# Patient Record
Sex: FEMALE | Race: WHITE | NOT HISPANIC OR LATINO | Employment: FULL TIME | URBAN - METROPOLITAN AREA
[De-identification: names, ages, dates, MRNs, and addresses within clinical notes are randomized per-mention and may not be internally consistent; named-entity substitution may affect disease eponyms.]

---

## 2018-03-22 ENCOUNTER — LAB REQUISITION (OUTPATIENT)
Dept: LAB | Facility: HOSPITAL | Age: 48
End: 2018-03-22

## 2018-03-22 ENCOUNTER — OFFICE VISIT (OUTPATIENT)
Dept: GASTROENTEROLOGY | Facility: CLINIC | Age: 48
End: 2018-03-22

## 2018-03-22 VITALS
RESPIRATION RATE: 14 BRPM | OXYGEN SATURATION: 98 % | HEIGHT: 72 IN | SYSTOLIC BLOOD PRESSURE: 110 MMHG | BODY MASS INDEX: 26.82 KG/M2 | WEIGHT: 198 LBS | TEMPERATURE: 98.7 F | DIASTOLIC BLOOD PRESSURE: 80 MMHG | HEART RATE: 90 BPM

## 2018-03-22 DIAGNOSIS — K50.80 CROHN'S DISEASE OF BOTH SMALL AND LARGE INTESTINE WITHOUT COMPLICATION (HCC): ICD-10-CM

## 2018-03-22 DIAGNOSIS — Z00.00 ROUTINE GENERAL MEDICAL EXAMINATION AT A HEALTH CARE FACILITY: ICD-10-CM

## 2018-03-22 DIAGNOSIS — R53.83 OTHER FATIGUE: ICD-10-CM

## 2018-03-22 DIAGNOSIS — K50.80 CROHN'S DISEASE OF BOTH SMALL AND LARGE INTESTINE WITHOUT COMPLICATION (HCC): Primary | ICD-10-CM

## 2018-03-22 DIAGNOSIS — R53.83 MALAISE AND FATIGUE: ICD-10-CM

## 2018-03-22 DIAGNOSIS — R53.81 MALAISE AND FATIGUE: ICD-10-CM

## 2018-03-22 PROCEDURE — 99213 OFFICE O/P EST LOW 20 MIN: CPT | Performed by: INTERNAL MEDICINE

## 2018-03-22 PROCEDURE — 36415 COLL VENOUS BLD VENIPUNCTURE: CPT | Performed by: INTERNAL MEDICINE

## 2018-03-22 NOTE — PROGRESS NOTES
"[facility] Gastroenterology   History & Physical    Chief Complaint   Patient presents with   • Crohn's Disease         Subjective CC: Crohn's disease      HPI  Doing well with her Crohn's and is on no medications.  Has to take Metamucil to have normal consistency of stool and control without incontinence.  No bleeding.  Last colonoscopy in 2016.  She has lost 50 lbs voluntarily.        History reviewed. No pertinent past medical history.      History reviewed. No pertinent family history.       reports that she has never smoked. She has never used smokeless tobacco. She reports that she does not drink alcohol or use drugs.      No current outpatient prescriptions on file.    Allergies:  Review of patient's allergies indicates no known allergies.    ROS:    Review of Systems   All other systems reviewed and are negative.      Objective     Blood pressure 110/80, pulse 90, temperature 98.7 °F (37.1 °C), temperature source Temporal Artery , resp. rate 14, height 182.9 cm (72\"), weight 89.8 kg (198 lb), SpO2 98 %.    Physical Exam   Constitutional: Pt is oriented to person, place, and time and well-developed, well-nourished, and in no distress.   HENT:   Mouth/Throat: Oropharynx is clear and moist.   Neck: Normal range of motion. Neck supple.   Cardiovascular: Normal rate, regular rhythm and normal heart sounds.    Pulmonary/Chest: Effort normal and breath sounds normal. No respiratory distress. No  wheezes.   Abdominal: Soft. Bowel sounds are normal.  Soft, non-tender, normal bowel sounds; no bruits, organomegaly or masses.  Skin: Skin is warm and dry.   Psychiatric: Mood, memory, affect and judgment normal.     Assessment/Plan Overall her Crohn's is stable.  She is due for another colonoscopy for surveillence and biopsies looking for dysplasia.  Since she is feeling a bit more fatigued, will check for anemia, B-12 deficiency.     Diagnosis:  Birgit was seen today for crohn's disease.    Diagnoses and " all orders for this visit:    Crohn's disease of both small and large intestine without complication  -     CBC & Differential  -     Folate  -     Vitamin B12  -     Iron Profile  -     C-reactive Protein  -     External [facility] Surgical/Procedural Request    Malaise and fatigue  -     CBC & Differential  -     Folate  -     Vitamin B12  -     Iron Profile  -     C-reactive Protein        Anticipated Surgical Procedure:    The risks, benefits, and alternatives of this procedure have been discussed with the patient or the responsible party- the patient understands and agrees to proceed.

## 2018-03-23 LAB
BASOPHILS # BLD AUTO: 0.05 10*3/MM3 (ref 0–0.2)
BASOPHILS NFR BLD AUTO: 0.7 % (ref 0–1)
CRP SERPL-MCNC: 0.06 MG/DL (ref 0–1)
EOSINOPHIL # BLD AUTO: 0.13 10*3/MM3 (ref 0–0.3)
EOSINOPHIL NFR BLD AUTO: 1.7 % (ref 0–3)
ERYTHROCYTE [DISTWIDTH] IN BLOOD BY AUTOMATED COUNT: 12.9 % (ref 11.3–14.5)
FOLATE SERPL-MCNC: 19.77 NG/ML (ref 3.2–20)
HCT VFR BLD AUTO: 42.5 % (ref 34.5–44)
HGB BLD-MCNC: 14.3 G/DL (ref 11.5–15.5)
IMM GRANULOCYTES # BLD: 0.06 10*3/MM3 (ref 0–0.03)
IMM GRANULOCYTES NFR BLD: 0.8 % (ref 0–0.6)
IRON SATN MFR SERPL: 25 % (ref 15–50)
IRON SERPL-MCNC: 92 MCG/DL (ref 50–175)
LYMPHOCYTES # BLD AUTO: 2.09 10*3/MM3 (ref 0.6–4.8)
LYMPHOCYTES NFR BLD AUTO: 27.7 % (ref 24–44)
MCH RBC QN AUTO: 31 PG (ref 27–31)
MCHC RBC AUTO-ENTMCNC: 33.6 G/DL (ref 32–36)
MCV RBC AUTO: 92.2 FL (ref 80–99)
MONOCYTES # BLD AUTO: 0.69 10*3/MM3 (ref 0–1)
MONOCYTES NFR BLD AUTO: 9.2 % (ref 0–12)
NEUTROPHILS # BLD AUTO: 4.52 10*3/MM3 (ref 1.5–8.3)
NEUTROPHILS NFR BLD AUTO: 59.9 % (ref 41–71)
PLATELET # BLD AUTO: 236 10*3/MM3 (ref 150–450)
RBC # BLD AUTO: 4.61 10*6/MM3 (ref 3.89–5.14)
TIBC SERPL-MCNC: 371 MCG/DL (ref 250–450)
UIBC SERPL-MCNC: 279 MCG/DL
VIT B12 SERPL-MCNC: 227 PG/ML (ref 211–911)
WBC # BLD AUTO: 7.54 10*3/MM3 (ref 3.5–10.8)

## 2019-03-05 ENCOUNTER — OFFICE VISIT (OUTPATIENT)
Dept: GASTROENTEROLOGY | Facility: CLINIC | Age: 49
End: 2019-03-05

## 2019-03-05 ENCOUNTER — LAB (OUTPATIENT)
Dept: LAB | Facility: HOSPITAL | Age: 49
End: 2019-03-05

## 2019-03-05 VITALS
SYSTOLIC BLOOD PRESSURE: 120 MMHG | OXYGEN SATURATION: 98 % | HEART RATE: 94 BPM | TEMPERATURE: 97.4 F | DIASTOLIC BLOOD PRESSURE: 80 MMHG | BODY MASS INDEX: 27.63 KG/M2 | HEIGHT: 72 IN | WEIGHT: 204 LBS

## 2019-03-05 DIAGNOSIS — Z87.19 HISTORY OF CROHN'S DISEASE: Primary | ICD-10-CM

## 2019-03-05 DIAGNOSIS — Z87.19 HISTORY OF CROHN'S DISEASE: ICD-10-CM

## 2019-03-05 LAB
ALBUMIN SERPL-MCNC: 4.36 G/DL (ref 3.2–4.8)
ALBUMIN/GLOB SERPL: 2.1 G/DL (ref 1.5–2.5)
ALP SERPL-CCNC: 78 U/L (ref 25–100)
ALT SERPL W P-5'-P-CCNC: 13 U/L (ref 7–40)
ANION GAP SERPL CALCULATED.3IONS-SCNC: 6 MMOL/L (ref 3–11)
AST SERPL-CCNC: 17 U/L (ref 0–33)
BILIRUB SERPL-MCNC: 0.6 MG/DL (ref 0.3–1.2)
BUN BLD-MCNC: 14 MG/DL (ref 9–23)
BUN/CREAT SERPL: 17.3 (ref 7–25)
CALCIUM SPEC-SCNC: 9.1 MG/DL (ref 8.7–10.4)
CHLORIDE SERPL-SCNC: 104 MMOL/L (ref 99–109)
CO2 SERPL-SCNC: 30 MMOL/L (ref 20–31)
CREAT BLD-MCNC: 0.81 MG/DL (ref 0.6–1.3)
CRP SERPL-MCNC: 0.1 MG/DL (ref 0–1)
DEPRECATED RDW RBC AUTO: 43.8 FL (ref 37–54)
ERYTHROCYTE [DISTWIDTH] IN BLOOD BY AUTOMATED COUNT: 13.2 % (ref 11.3–14.5)
GFR SERPL CREATININE-BSD FRML MDRD: 75 ML/MIN/1.73
GLOBULIN UR ELPH-MCNC: 2 GM/DL
GLUCOSE BLD-MCNC: 112 MG/DL (ref 70–100)
HCT VFR BLD AUTO: 39.4 % (ref 34.5–44)
HGB BLD-MCNC: 12.7 G/DL (ref 11.5–15.5)
MCH RBC QN AUTO: 29.6 PG (ref 27–31)
MCHC RBC AUTO-ENTMCNC: 32.2 G/DL (ref 32–36)
MCV RBC AUTO: 91.8 FL (ref 80–99)
PLATELET # BLD AUTO: 215 10*3/MM3 (ref 150–450)
PMV BLD AUTO: 11.2 FL (ref 6–12)
POTASSIUM BLD-SCNC: 4.5 MMOL/L (ref 3.5–5.5)
PROT SERPL-MCNC: 6.4 G/DL (ref 5.7–8.2)
RBC # BLD AUTO: 4.29 10*6/MM3 (ref 3.89–5.14)
SODIUM BLD-SCNC: 140 MMOL/L (ref 132–146)
WBC NRBC COR # BLD: 6.03 10*3/MM3 (ref 3.5–10.8)

## 2019-03-05 PROCEDURE — 36415 COLL VENOUS BLD VENIPUNCTURE: CPT | Performed by: NURSE PRACTITIONER

## 2019-03-05 PROCEDURE — 85027 COMPLETE CBC AUTOMATED: CPT | Performed by: NURSE PRACTITIONER

## 2019-03-05 PROCEDURE — 80053 COMPREHEN METABOLIC PANEL: CPT

## 2019-03-05 PROCEDURE — 82652 VIT D 1 25-DIHYDROXY: CPT

## 2019-03-05 PROCEDURE — 99213 OFFICE O/P EST LOW 20 MIN: CPT | Performed by: NURSE PRACTITIONER

## 2019-03-05 PROCEDURE — 86140 C-REACTIVE PROTEIN: CPT

## 2019-03-05 NOTE — PROGRESS NOTES
GASTROENTEROLOGY OUTPATIENT ESTABLISHED PATIENT NOTE  Patient: CHALO ENNIS : 1970  Date of Service: 2019  CC: Follow-up (crohns disease)    Assessment/Plan                                             ASSESSMENT & PLANS     History of Crohn's disease dx  s/p Imuran and Pentasa.  Currently, not on tx.   -     C-reactive Protein; Future  -     Vitamin D 1,25 Dihydroxy; Future  -     Comprehensive Metabolic Panel; Future  -     CBC (No Diff)  · Repeat colonoscopy by May 2021 or sooner if clinically indicated    Follow Up: PRN      DISCUSSION: The above plan was delineated in details with patient and all questions and concerns were answered.  Patient is also given contact information.  Patient is to return as scheduled or sooner if new problems arise.   Subjective                                                     SUBJECTIVE   History of Present Illness  Ms. Chalo Ennis is a 49 y.o. female who is here for Follow-up (crohns disease)  Pt of Dr Izaguirre since age 15.   Was on Imuran and Pentasa previously. Has been able to be off meds w/o flare of Crohn's disease.   Takes metamucil 2 tablespoons once daily.  Able to have a BM once daily or once every other day. Pt denies dark black stools or bright red blood in the stools, in the toilet bowl, or on the toilet tissue.  Occasionally, pt feels constipated, but has not tried taking more Metamucil.    Pt denies anorexia, nausea, vomiting, dysphagia, odynophagia, heartburn, reflux, regurgitation, or early satiety.  Pt has good appetite. No abdominal pain. Occasional NSAIDS.     Colonoscopy recommended on 2016 by  for screening, showed normal colonoscopy to the terminal ileum and minimal antral stricture.  Pt was recommended to have repeat colonoscopy in 5 yrs    ROS:Review of Systems   Constitutional: Negative.         Pt currently or recently takes NSAIDS ( (i.e Ibuprofen, Aleve, Advil, Exedrin, BC Powder, diclofenac, meloxicam, & Naproxen, etc)?  Yes    Pt currently or recently takes abx? No   HENT: Negative.    Eyes: Negative.    Respiratory: Negative.    Cardiovascular: Negative.    Gastrointestinal: Negative.    Endocrine: Negative.    Genitourinary: Negative.    Musculoskeletal: Negative.    Skin: Negative.    Allergic/Immunologic: Negative.    Neurological: Negative.    Hematological: Negative.    Psychiatric/Behavioral: Negative.    Subjective   PAST MED HX: Pt  has a past medical history of Crohn's disease (CMS/HCC).  PAST SURG HX: Pt  has a past surgical history that includes Colonoscopy.  FAM HX: family history includes Crohn's disease in her mother.  SOC HX: Pt  reports that  has never smoked. she has never used smokeless tobacco. She reports that she does not drink alcohol or use drugs.    Objective                                                           OBJECTIVE   Allergy: Pt has No Known Allergies.  MEDS:•  Cyanocobalamin (VITAMIN B-12 IJ), Inject  as directed Every 30 (Thirty) Days., Disp: , Rfl:   Lab Results   Component Value Date    WBC 7.54 03/22/2018    EOSABS 0.13 03/22/2018    HGB 14.3 03/22/2018    HCT 42.5 03/22/2018    MCV 92.2 03/22/2018    MCHC 33.6 03/22/2018     03/22/2018    [Addendum:  Lab Results   Component Value Date    WBC 6.03 03/05/2019    HGB 12.7 03/05/2019    HCT 39.4 03/05/2019    MCV 91.8 03/05/2019     03/05/2019     Lab Results   Component Value Date    GLUCOSE 112 (H) 03/05/2019    BUN 14 03/05/2019    CREATININE 0.81 03/05/2019    EGFRIFNONA 75 03/05/2019    BCR 17.3 03/05/2019    K 4.5 03/05/2019    CO2 30.0 03/05/2019    CALCIUM 9.1 03/05/2019    ALBUMIN 4.36 03/05/2019    AST 17 03/05/2019    ALT 13 03/05/2019     Lab Results   Component Value Date    CRP 0.10 03/05/2019     Lab Results   Component Value Date    YLYR935 41.1 03/05/2019     Wt Readings from Last 5 Encounters:   03/05/19 92.5 kg (204 lb)   03/22/18 89.8 kg (198 lb)   body mass index is 27.67 kg/m².,Temp: 97.4 °F (36.3 °C),BP:  120/80,Heart Rate: 94   Physical Exam  General Well developed; well nourished; no acute distress.   ENT Oral mucosa pink and moist without thrush or lesions.    GI Abd soft, NT, ND, normal active bowel sounds.  No HSM.  No abd hernia    Pt care team: Kitty MAYES & JIM Mota  03/05/19 3:06 PM  [facility]--Gastroenterology  [phone]    CC: , Jose Hughes MD   [facility]  / [place] [address] FAX:[phone]

## 2019-03-07 LAB — 1,25(OH)2D3 SERPL-MCNC: 41.1 PG/ML (ref 19.9–79.3)

## 2019-03-11 ENCOUNTER — TELEPHONE (OUTPATIENT)
Dept: GASTROENTEROLOGY | Facility: CLINIC | Age: 49
End: 2019-03-11

## 2019-03-15 NOTE — TELEPHONE ENCOUNTER
PT CALLED [facility]; RETURNING DENIA'S CALL.  ROUTING MESSAGE TO DENIA.

## 2019-03-18 ENCOUNTER — TELEPHONE (OUTPATIENT)
Dept: GASTROENTEROLOGY | Facility: CLINIC | Age: 49
End: 2019-03-18

## 2019-03-18 NOTE — TELEPHONE ENCOUNTER
I discussed lab results w/ pt.  No need to f/u unless pt has sx.     Pt reports on intermittent dysphagia to solid food.  No wt loss. No odynophagia.  Pt is encouraged to take sips in between bites, add gravy to meat, and start OTC omeprazole x 14 days.  If sx persists, pt is to let me know    Seldom NSAIDS.  Non smoker.

## 2019-11-08 ENCOUNTER — TELEPHONE (OUTPATIENT)
Dept: GASTROENTEROLOGY | Facility: CLINIC | Age: 49
End: 2019-11-08

## 2019-11-08 NOTE — TELEPHONE ENCOUNTER
I called patient back. Gave her our fax number. She need to fax over some patient assistance forms to be fill out out for her mom which is our patient too.

## 2019-11-08 NOTE — TELEPHONE ENCOUNTER
Regarding: FW: Non-Urgent Medical Question  Contact: [phone]      ----- Message -----  From: Birgit Ramirez  Sent: 11/8/2019   7:55 AM  To: Bladimir S[facility]  Subject: Non-Urgent Medical Question                      ----- Message from Mychart, Generic sent at 11/8/2019  7:55 AM EST -----    I need to speak to Kitty Al Nurse about some paper work that I need filled out.  Please all me at [phone] at your convenience.  Thank you.

## 2020-01-10 ENCOUNTER — OFFICE VISIT (OUTPATIENT)
Dept: PULMONOLOGY | Facility: CLINIC | Age: 50
End: 2020-01-10

## 2020-01-10 VITALS
HEART RATE: 94 BPM | TEMPERATURE: 98.2 F | BODY MASS INDEX: 28.12 KG/M2 | HEIGHT: 70 IN | SYSTOLIC BLOOD PRESSURE: 130 MMHG | DIASTOLIC BLOOD PRESSURE: 80 MMHG | WEIGHT: 196.4 LBS | OXYGEN SATURATION: 96 %

## 2020-01-10 DIAGNOSIS — R93.89 ABNORMAL CHEST CT: ICD-10-CM

## 2020-01-10 DIAGNOSIS — F17.298 OTHER TOBACCO PRODUCT NICOTINE DEPENDENCE WITH OTHER NICOTINE-INDUCED DISORDER: ICD-10-CM

## 2020-01-10 DIAGNOSIS — Z00.6 EXAMINATION FOR NORMAL COMPARISON FOR CLINICAL RESEARCH: Primary | ICD-10-CM

## 2020-01-10 DIAGNOSIS — J98.4 CAVITARY LESION OF LUNG: Primary | ICD-10-CM

## 2020-01-10 PROCEDURE — 99204 OFFICE O/P NEW MOD 45 MIN: CPT | Performed by: INTERNAL MEDICINE

## 2020-01-10 PROCEDURE — 94060 EVALUATION OF WHEEZING: CPT | Performed by: INTERNAL MEDICINE

## 2020-01-10 PROCEDURE — 94726 PLETHYSMOGRAPHY LUNG VOLUMES: CPT | Performed by: INTERNAL MEDICINE

## 2020-01-10 PROCEDURE — 94729 DIFFUSING CAPACITY: CPT | Performed by: INTERNAL MEDICINE

## 2020-01-10 RX ORDER — LEVOFLOXACIN 750 MG/1
TABLET ORAL
COMMUNITY
Start: 2020-01-05 | End: 2020-01-10 | Stop reason: SDUPTHER

## 2020-01-10 RX ORDER — ALBUTEROL SULFATE 90 UG/1
4 AEROSOL, METERED RESPIRATORY (INHALATION) ONCE
Status: COMPLETED | OUTPATIENT
Start: 2020-01-10 | End: 2020-01-10

## 2020-01-10 RX ORDER — ALBUTEROL SULFATE 90 UG/1
AEROSOL, METERED RESPIRATORY (INHALATION)
COMMUNITY
Start: 2020-01-05 | End: 2021-03-17

## 2020-01-10 RX ORDER — LEVOFLOXACIN 750 MG/1
750 TABLET ORAL DAILY
Qty: 5 TABLET | Refills: 0 | Status: SHIPPED | OUTPATIENT
Start: 2020-01-10 | End: 2020-01-15

## 2020-01-10 RX ADMIN — ALBUTEROL SULFATE 4 PUFF: 90 AEROSOL, METERED RESPIRATORY (INHALATION) at 10:36

## 2020-01-10 NOTE — PROGRESS NOTES
New Pulmonary Patient Office Visit      Patient Name: Birgit Ramriez    Referring Physician: Jose Raines MD    Chief Complaint:    Chief Complaint   Patient presents with   • Cough       History of Present Illness: Birgit Ramirez is a 49 y.o. female who is here today to establish care with Pulmonary.      Patient is 49-year-old female with history of smoking but vaping for last 12 years.  She states that she takes stop from the prepackaged and not doing the Juul or marijuana which have been implicated in more recent respiratory issues.  Still counseled her to quit vaping as we are still not aware of the whole picture and not completely sure if further products are safe.  She verbalized understanding.  She states that she was in her usual state of health up until last Friday when she started coughing with subjective fevers and yellowish-green sputum.  She did notice some blood-tinged sputum as well.  She denies any liang hemoptysis however.  Her appetite is down and she has lost about 10 pounds in last 1 week.  Prior to this illness she was doing well.  She does have chronic sinus issues and postnasal drip but other than that respiratory wise she was doing okay.  She was seen by primary care and was placed on Levaquin for 5 days on Monday as well as pro-air inhaler which she is using only as needed for now.  She has not weight since she had this illness.  She had a CT scan done which confirmed the finding of pneumonia in the left lower lobe with dense consolidation and there was also a small cavitary area noted in the right upper lobe posteriorly and she was referred here for further evaluation.  Patient states that she is starting to feel better.  She is not running fevers anymore.  Cough is still there but intermittent and no hemoptysis now.  Appetite is still poor.  She gets short of breath with activity but pro-air seems to be helping.  She denies any other sick contacts or any recent travel anywhere.  No  other exposures at home.    Patient works as a certified coder for cytopathology lab.  Her  vapes as well.    Patient gets her routine cancer screenings with Pap smears, mammograms and colonoscopy and everything has been normal in the recent past.    Patient states that last year she had couple of episodes where she choked on food and one time with chicken.  She denies any recurrent aspiration episodes either.  She states that now she is very careful in what she is eating and how she is eating and chewing things.  She was encouraged to continue doing that for now and if she continues to have recurrent episode will get speech evaluation.    Patient has had influenza vaccination this year as well.      Subjective      Review of Systems:   Review of Systems   Constitutional: Positive for fatigue and fever.   HENT: Positive for postnasal drip, rhinorrhea and sinus pressure.    Respiratory: Positive for cough (blood tinged sputum) and shortness of breath.    Cardiovascular: Negative.    Gastrointestinal: Positive for nausea.   Endocrine: Positive for polydipsia.   Musculoskeletal: Negative.    Skin: Negative.    Neurological: Negative.    Hematological: Negative.    Psychiatric/Behavioral: Positive for depressed mood. The patient is nervous/anxious.    All other systems reviewed and are negative.      Past Medical History:   Past Medical History:   Diagnosis Date   • Crohn's disease (CMS/HCC)        Past Surgical History:   Past Surgical History:   Procedure Laterality Date   • CHOLECYSTECTOMY     • COLON RESECTION     • COLONOSCOPY         Family History:   Family History   Problem Relation Age of Onset   • Crohn's disease Mother    • Cancer Mother    • Diabetes Father    • Hypertension Father    • Hyperlipidemia Father    • Peripheral vascular disease Father        Social History:   Social History     Socioeconomic History   • Marital status: Single     Spouse name: Not on file   • Number of children: Not on  "file   • Years of education: Not on file   • Highest education level: Not on file   Tobacco Use   • Smoking status: Current Every Day Smoker     Packs/day: 0.50     Years: 18.00     Pack years: 9.00     Types: Cigarettes, Electronic Cigarette   • Smokeless tobacco: Never Used   • Tobacco comment: quit cigarettes, still vaping   Substance and Sexual Activity   • Alcohol use: No   • Drug use: No   • Sexual activity: Defer       Medications:     Current Outpatient Medications:   •  albuterol sulfate  (90 Base) MCG/ACT inhaler, , Disp: , Rfl:   •  Cyanocobalamin (VITAMIN B-12 IJ), Inject  as directed Every 30 (Thirty) Days., Disp: , Rfl:   •  levoFLOXacin (LEVAQUIN) 750 MG tablet, Take 1 tablet by mouth Daily for 5 days., Disp: 5 tablet, Rfl: 0  •  sertraline (ZOLOFT) 50 MG tablet, , Disp: , Rfl:   No current facility-administered medications for this visit.     Allergies:   No Known Allergies    Objective     Physical Exam:  Vital Signs:   Vitals:    01/10/20 1045   BP: 130/80   Pulse: 94   Temp: 98.2 °F (36.8 °C)   SpO2: 96%  Comment: resting, room air   Weight: 89.1 kg (196 lb 6.4 oz)   Height: 177.8 cm (70\")       Physical Exam   Constitutional: She is oriented to person, place, and time. She appears well-developed and well-nourished. No distress.   HENT:   Head: Normocephalic and atraumatic.   Nose: Nose normal.   Mouth/Throat: Oropharynx is clear and moist. No oropharyngeal exudate.   Thrush: None  Mallampati Score: 2    Eyes: Pupils are equal, round, and reactive to light. EOM are normal. Right eye exhibits no discharge. Left eye exhibits no discharge. No scleral icterus.   Neck: Neck supple. No tracheal deviation present. No thyromegaly present.   Cardiovascular: Normal rate, regular rhythm and normal heart sounds. Exam reveals no friction rub.   No murmur heard.  Pulmonary/Chest: Effort normal. No stridor. No respiratory distress. She has no wheezes. She has no rales (left base rales). She exhibits no " tenderness.   Abdominal: Soft. She exhibits no distension. There is no tenderness. There is no guarding.   Musculoskeletal: She exhibits no edema or tenderness.   Clubbing: none   Lymphadenopathy:     She has no cervical adenopathy.   Neurological: She is alert and oriented to person, place, and time. No cranial nerve deficit. Coordination normal.   Skin: She is not diaphoretic.   Psychiatric: She has a normal mood and affect. Her behavior is normal. Judgment and thought content normal.   Nursing note and vitals reviewed.      Results Review:   I reviewed the patient's new clinical results.  I reviewed the patient's new imaging results and agree with the interpretation.    CT scan of the chest reviewed personally and showed dense consolidation left lower lobe with air bronchograms.  No bronchus cutoff sign noted.  Small effusion in the left posterior base.  There is also a very small cavitary lesion in the right upper lobe posteriorly.  Mild reactive lymphadenopathy likely.    PFT IMPRESSION:   1. Available data suggests nonspecific defect.    2. No significant bronchodilator response, but this does not preclude their clinical use.  3. Lung volume reveals normal       4. Airway resistance is mildly elevated.  5. DLCO is mildly reduced and could be suggestive of emphysema, interstitial lung disease, significant anemia, Pulmonary vascular disease etc. Clinical correlation will be required.      Assessment / Plan      Assessment:   Problem List Items Addressed This Visit     None      Visit Diagnoses     Cavitary lesion of lung    -  Primary    Relevant Medications    albuterol sulfate HFA (PROVENTIL HFA;VENTOLIN HFA;PROAIR HFA) inhaler 4 puff (Completed)    Other Relevant Orders    Pulmonary Function Test (Completed)    Abnormal chest CT        Relevant Orders    CT Chest With Contrast    Other tobacco product nicotine dependence with other nicotine-induced disorder              Plan:   1.  Patient CT scan is pretty  classic for left lower lobe lobar pneumonia.  Clinical symptomatology is also suggesting of acute infectious process as duration of illnesses acute.  I will recommend extending the course of Levaquin to 10 days.  Patient is not having any neuromuscular problems or any overt side effects from Levaquin so I did take the liberty to increase the dose and give her another refill on the medication.  Hopefully she will continue to show improvement.  If things get worse then advised to call us and we will see her sooner.  Otherwise I like to see her back in 5 weeks with another CT scan to make sure things are improving.  She does have a small cavitary area in the right upper lobe peripherally and posteriorly.  She has never had CT scans before so unclear the chronicity of this finding.  That will need to be monitored closely for now with frequent CT scans.  If it improves after treatment of infection then likely could be inflammatory process as well.    2.  Discussed with the patient that if she have any recurrence of hemoptysis or things do not improve may need to consider bronchoscopy but do not feel the need for it at this point.    3.  Counseled to stop vaping.  Pros and cons of this approach reviewed with her.  She will work on that aspect.    4.  PFTs are showing mostly nonspecific defect likely due to dense consolidation in the lower lobe we are not getting good picture.  No definite evidence of COPD at this point.  There is touch of emphysema.  Hopefully quitting smoking should help in that regard.  Continue pro-air as needed for now.    5.  Up-to-date on influenza vaccination.  Will consider pneumonia vaccine when she is done with this acute illness.    Thank you for allowing me to participate in the care of this patient.  We will follow her very closely.    Follow Up:   Return in about 5 weeks (around 2/14/2020).    Discussed plan of care in detail with patient today. Patient verbally understands and agrees.       Jesse Kenny MD  Pulmonary Critical Care and Sleep Medicine      Please note that portions of this note may have been completed with a voice recognition program. Efforts were made to edit the dictations, but occasionally words are mistranscribed.

## 2020-03-03 ENCOUNTER — TRANSCRIBE ORDERS (OUTPATIENT)
Dept: PULMONOLOGY | Facility: CLINIC | Age: 50
End: 2020-03-03

## 2020-03-04 ENCOUNTER — OFFICE VISIT (OUTPATIENT)
Dept: PULMONOLOGY | Facility: CLINIC | Age: 50
End: 2020-03-04

## 2020-03-04 VITALS
DIASTOLIC BLOOD PRESSURE: 80 MMHG | BODY MASS INDEX: 29.2 KG/M2 | TEMPERATURE: 98 F | OXYGEN SATURATION: 98 % | SYSTOLIC BLOOD PRESSURE: 122 MMHG | WEIGHT: 204 LBS | HEIGHT: 70 IN | HEART RATE: 88 BPM

## 2020-03-04 DIAGNOSIS — Z00.6 EXAMINATION FOR NORMAL COMPARISON FOR CLINICAL RESEARCH: Primary | ICD-10-CM

## 2020-03-04 DIAGNOSIS — R93.89 ABNORMAL CHEST CT: Primary | ICD-10-CM

## 2020-03-04 DIAGNOSIS — J18.9 COMMUNITY ACQUIRED PNEUMONIA OF LEFT LOWER LOBE OF LUNG: ICD-10-CM

## 2020-03-04 PROCEDURE — 99215 OFFICE O/P EST HI 40 MIN: CPT | Performed by: INTERNAL MEDICINE

## 2020-03-04 NOTE — PROGRESS NOTES
"     Follow Up Office Visit      Patient Name: Birgit Ramirez    Chief Complaint:    Chief Complaint   Patient presents with   • cavitary lesion of lung       History of Present Illness: Birgit Ramirez is a 50 y.o. female who is here today for follow up after repeat CT chest    Patient is here for follow-up.  States that overall she is feeling little better than last time.  After her previous visit here she developed cough and worsening sputum production again and underwent another round of antibiotics.  Since then she has been doing well.  She still has cough and produces yellowish sputum.  No hemoptysis now.  Denies any wheezing.  Occasionally uses albuterol inhaler.  She does have chest discomfort in the left lower lobe on deep breathing.  Denies any right-sided chest discomfort.  No fevers or night sweats at this point.  Appetite is coming back.    Subjective      Review of Systems:   Review of Systems   Constitutional: Positive for fatigue and fever.   HENT: Positive for rhinorrhea and trouble swallowing.    Respiratory: Positive for cough and shortness of breath.    Cardiovascular: Negative.    Gastrointestinal: Positive for diarrhea.   Endocrine: Negative.    Musculoskeletal: Negative.    Skin: Negative.    Neurological: Negative.    Hematological: Negative.    Psychiatric/Behavioral: Negative.    All other systems reviewed and are negative.      The following portions of the patient's history were reviewed and updated as appropriate: allergies, current medications, past family history, past medical history, past social history, past surgical history and problem list.    Objective     Physical Exam:  Vital Signs:   Vitals:    03/04/20 1556   BP: 122/80   Pulse: 88   Temp: 98 °F (36.7 °C)   SpO2: 98%  Comment: resting at room air   Weight: 92.5 kg (204 lb)   Height: 177.8 cm (70\")       Physical Exam  Constitutional: She is oriented to person, place, and time. She appears well-developed and well-nourished. No " distress.   HENT:   Head: Normocephalic and atraumatic.   Nose: Nose normal.   Mouth/Throat: Oropharynx is clear and moist. No oropharyngeal exudate.   Thrush: None  Mallampati Score: 2    Cardiovascular: Normal rate, regular rhythm and normal heart sounds. Exam reveals no friction rub.   No murmur heard.  Pulmonary/Chest: Effort normal. No stridor. No respiratory distress. She has no wheezes.  No rales.  No pleural rub.    Musculoskeletal: She exhibits no edema or tenderness.   Neurological: She is alert and oriented to person, place, and time. No cranial nerve deficit. Coordination normal.   Psychiatric: She has a normal mood and affect. Her behavior is normal. Judgment and thought content normal.   Nursing note and vitals reviewed.         Results Review:   CT chest reviewed which is done at [facility] and uploaded into our PACS.  Left lower lobe dense consolidation has completely resolved.  No pleural effusion or pleural thickening noted.  Right upper lobe cavitary opacity is still persisting but unchanged from before.  There mild groundglass tree-in-bud opacities noted in right midlung field as well as right lower lobe which seems slightly worse compared to before.  No significant mediastinal adenopathy noted.  No paracardial or pleural effusions noted.    Assessment / Plan      Assessment:   Problem List Items Addressed This Visit     None      Visit Diagnoses     Abnormal chest CT    -  Primary    Community acquired pneumonia of left lower lobe of lung (CMS/HCC)              Plan:   1.  Patient CT scans reviewed in detail.  Imaging showed to the patient as well.  Looking through it left lower lobe pneumonia has completely resolved which is reassuring.  She continues to have some pleuritic discomfort which could be residual inflammation and told the patient should improve with time.  More concerning finding is right upper lobe cavitary lesion which seems pretty small but has not improved or  worsened.  Given her smoking history would be concerned about possible malignancy as well.  Since she is still coughing and producing sputum and now we are seeing tree-in-bud appearance is right lung are getting slightly worse, would be concerned about some atypical infections.  Did discuss with the patient that atypical mycobacterial infection is possible.  I would like to look for that.  Did give patient 3 sputum containers to collect her sputum into send it for AFB smear and cultures.  If no atypical Mycobacterium disease noted then concern would be malignant process.    2.  We discussed further options including PET scan versus biopsy versus resection.  I do not have previous films available so as to tell her the stability of this process.  Patient is concerned about the cost obviously.  Discussed that PET scan would be more in line with guiding safe lesions are glucose avid then we proceed more aggressively.  If PET scan is negative then we could do serial CT scan follow-up.  If PET scan is positive then putting her through resection probably would be reasonable option as well.  She understood her options and she wants to think about it.  In the meantime we will rule out atypical Mycobacterium disease which can give these kind of abnormality in the chest imaging.  Other etiologies such as vasculitic processes seem less likely given we are not seeing any worsening in that process.  Patient is not immune compromised so less likely fungal infections.    3.  Patient has quit vaping and she was commended on that.  Advised to stay off of vaping products at this point.  She may have component of bronchial inflammation and I will like to use Incruse inhaler once a day.  Samples given to her correct technique of using the inhaler reviewed and side effects reviewed.  She can continue using albuterol as needed.  If she does feel improvement with Incruse inhaler then we can call in a prescription and keep her on that  inhaler.     Patient's questions answered to the best of my ability.  Advised patient to apprise us when she does sputum testing so that he can follow-up on the results since it will not be done in [facility] lab and we do not want to miss out on the results.  Depending on sputum results we will discuss further management options and set her up with PET scan versus biopsy depending on patient preference.    40 minutes spent in visit, reviewing things, discussion and counseling with more than 50% time spent in face-to-face counseling regarding current management, vaping cessation, further diagnostic and treatment plan along with shared decision making.    Follow Up:   2 months or sooner as needed.    Discussed plan of care in detail with patient today. Patient verbally understands and agrees.     Jesse Kenny MD  Pulmonary Critical Care and Sleep Medicine    Please note that portions of this note may have been completed with a voice recognition program. Efforts were made to edit the dictations, but occasionally words are mistranscribed.

## 2020-03-05 DIAGNOSIS — R93.89 ABNORMAL CHEST CT: ICD-10-CM

## 2020-04-20 ENCOUNTER — DOCUMENTATION (OUTPATIENT)
Dept: PULMONOLOGY | Facility: CLINIC | Age: 50
End: 2020-04-20

## 2020-04-20 NOTE — PROGRESS NOTES
Sputum AFB collected at [facility] on March 5, 2020 showed growth in liquid broth medium and sent to state laboratory for identification.  As of last reported date April 17, 2020 state identification still pending.

## 2020-05-15 ENCOUNTER — TELEMEDICINE (OUTPATIENT)
Dept: PULMONOLOGY | Facility: CLINIC | Age: 50
End: 2020-05-15

## 2020-05-15 DIAGNOSIS — A31.0 MAI (MYCOBACTERIUM AVIUM-INTRACELLULARE) (HCC): ICD-10-CM

## 2020-05-15 DIAGNOSIS — R05.9 COUGH: ICD-10-CM

## 2020-05-15 DIAGNOSIS — R91.1 LUNG NODULE: Primary | ICD-10-CM

## 2020-05-15 DIAGNOSIS — F17.211 CIGARETTE NICOTINE DEPENDENCE IN REMISSION: ICD-10-CM

## 2020-05-15 PROCEDURE — 99214 OFFICE O/P EST MOD 30 MIN: CPT | Performed by: INTERNAL MEDICINE

## 2020-05-15 NOTE — PROGRESS NOTES
Follow Up Office Visit      Patient Name: Birgit Ramirez    You have chosen to receive care through a telehealth visit.  Do you consent to use a video/audio connection for your medical care today? Yes      Chief Complaint:    Chief Complaint   Patient presents with   • Cough       History of Present Illness: Birgit Ramirez is a 50 y.o. female who is here today for follow up.    Birgit is seen in pulmonary clinic follow-up today.  Patient has previous history of smoking but was vaping for 12 years, currently quit.  Patient was seen here with cough and acute left lower lobe pneumonia.  Patient was treated with outpatient Levaquin and we brought her in to do repeat CT scan.  Left lower lobe consolidation improved but right upper lobe cavitary lesion persisted.  Patient has clinically improved in terms of her respiratory symptoms but continues to have cough with productive sputum.  She denies any hemoptysis.  Denies any fevers, chills or night sweats.  Overall she does not feel sick.  She denies any change in appetite.  Has gained some weight recently.  Denies any other GI symptoms of abdominal pain or diarrhea.  No headaches.    Subjective      Review of Systems:   Review of Systems   Constitutional: Positive for fatigue.   HENT: Negative.    Respiratory: Positive for cough (yellowish sputum, intermittently. No hemoptysis. ).    Cardiovascular: Negative.    Gastrointestinal: Negative.    Endocrine: Negative.    Musculoskeletal: Negative.    Skin: Negative.    Neurological: Negative.    Hematological: Negative.    Psychiatric/Behavioral: Negative.    All other systems reviewed and are negative.      The following portions of the patient's history were reviewed and updated as appropriate: allergies, current medications, past family history, past medical history, past social history, past surgical history and problem list.    Objective     Physical Exam:  Vital Signs: There were no vitals filed for this visit.    Physical  Exam   Constitutional: She is oriented to person, place, and time. She appears well-developed and well-nourished. No distress.   HENT:   Head: Normocephalic and atraumatic.   Pulmonary/Chest: Effort normal. No respiratory distress.   Musculoskeletal: She exhibits no edema.   Neurological: She is alert and oriented to person, place, and time.   Skin: She is not diaphoretic.   Psychiatric: She has a normal mood and affect. Her behavior is normal. Judgment and thought content normal.           Results Review:     Sputum culture 1/3 +VIDAL      Assessment / Plan      Assessment:   Problem List Items Addressed This Visit     None      Visit Diagnoses     Lung nodule    -  Primary    Relevant Orders    Ambulatory Referral to Infectious Disease    Cough        Relevant Orders    Ambulatory Referral to Infectious Disease    Cigarette nicotine dependence in remission        VIDAL (mycobacterium avium-intracellulare) (CMS/[facility])        Relevant Orders    Ambulatory Referral to Infectious Disease          Plan:   1.  Patient with persistent cough and sputum production along with cavitary lesion in the right upper lobe.  We were concerned about Mycobacterium avium disease process.  Sputum cultures were done.  She did at an outside facility and finally we were able to track down the results and she has 1 out of 3 sputum culture positive for Mycobacterium avium.  Smears were negative however.  Discussed with the patient that I am not sure about the importance of that but given her persistent symptoms I think it deserves opinion from infectious disease see if we need to treat it.  She is agreeable to have that consultation done.    2.  Cavitary lesion could also be malignant process.  Previously patient did not want PET scan as she was concerned about cost.  I would like to get her back in another month or so to repeat CT scan to make sure things are not getting worse.  If it is enlarging further then may need to do bronchoscopy for  more deeper cultures.  If we end up treating her for VIDAL then I will like to keep an eye on this nodule and if it is enlarging then will need resection.    3.  Patient was commended on quitting smoking and vaping.    4.  Patient is following social distancing guidelines and encouraged to follow those .    Follow Up:   4 weeks with CT chest  Refer to ID.    Discussed plan of care in detail with patient today. Patient verbally understands and agrees.      Jesse Kenny MD  Pulmonary Critical Care and Sleep Medicine    Please note that portions of this note may have been completed with a voice recognition program. Efforts were made to edit the dictations, but occasionally words are mistranscribed.

## 2020-05-19 ENCOUNTER — TRANSCRIBE ORDERS (OUTPATIENT)
Dept: PULMONOLOGY | Facility: CLINIC | Age: 50
End: 2020-05-19

## 2020-05-21 ENCOUNTER — TRANSCRIBE ORDERS (OUTPATIENT)
Dept: PULMONOLOGY | Facility: CLINIC | Age: 50
End: 2020-05-21

## 2020-06-05 DIAGNOSIS — R93.89 ABNORMAL CHEST CT: ICD-10-CM

## 2020-06-05 DIAGNOSIS — J98.4 CAVITARY LESION OF LUNG: ICD-10-CM

## 2020-06-05 DIAGNOSIS — R91.1 LUNG NODULE: Primary | ICD-10-CM

## 2020-06-08 ENCOUNTER — TRANSCRIBE ORDERS (OUTPATIENT)
Dept: PULMONOLOGY | Facility: CLINIC | Age: 50
End: 2020-06-08

## 2020-06-11 DIAGNOSIS — Z00.6 EXAMINATION FOR NORMAL COMPARISON FOR CLINICAL RESEARCH: Primary | ICD-10-CM

## 2020-06-12 ENCOUNTER — TELEMEDICINE (OUTPATIENT)
Dept: PULMONOLOGY | Facility: CLINIC | Age: 50
End: 2020-06-12

## 2020-06-12 DIAGNOSIS — F17.211 CIGARETTE NICOTINE DEPENDENCE IN REMISSION: ICD-10-CM

## 2020-06-12 DIAGNOSIS — R93.89 ABNORMAL CHEST CT: ICD-10-CM

## 2020-06-12 DIAGNOSIS — R91.1 LUNG NODULE: Primary | ICD-10-CM

## 2020-06-12 DIAGNOSIS — R91.1 LUNG NODULE: ICD-10-CM

## 2020-06-12 DIAGNOSIS — A31.0 MAI (MYCOBACTERIUM AVIUM-INTRACELLULARE) (HCC): ICD-10-CM

## 2020-06-12 DIAGNOSIS — J98.4 CAVITARY LESION OF LUNG: ICD-10-CM

## 2020-06-12 PROCEDURE — 99214 OFFICE O/P EST MOD 30 MIN: CPT | Performed by: INTERNAL MEDICINE

## 2020-06-12 NOTE — PROGRESS NOTES
Follow Up Office Visit      Patient Name: Birgit Ramirez    You have chosen to receive care through a telehealth visit.  Do you consent to use a video/audio connection for your medical care today? Yes      Chief Complaint:    Chief Complaint   Patient presents with   • Abnormal Chest X-ray       History of Present Illness: Birgit Ramirez is a 50 y.o. female who is here today for follow up.    Birgit is seen in pulmonary clinic follow-up today.  Patient has previous history of smoking but was vaping for 12 years, currently quit.  Patient was seen here with cough and acute left lower lobe pneumonia.  Patient was treated with outpatient Levaquin and we brought her in to do repeat CT scan.  Left lower lobe consolidation improved but patient was noted to have a right upper lobe cavitary lesion was noted.  Patient subsequently had sputum AFB testing done and that showed 1 out of 3 VIDAL positive.  Patient was referred to infectious disease and she has not been evaluated by them.  Patient continues to have morning cough with sputum production but denies any hemoptysis.  Denies any chest pain.  Denies any fevers, chills or night sweats.  Denies any change in appetite or weight loss either.  Overall states that she is feeling pretty good.      Subjective      Review of Systems:   Review of Systems   Constitutional: Positive for fatigue.   HENT: Negative.    Respiratory: Positive for cough (yellowish sputum, intermittently. No hemoptysis. ).    Cardiovascular: Negative.    Gastrointestinal: Negative.    Endocrine: Negative.    Musculoskeletal: Negative.    Skin: Negative.    Neurological: Negative.    Hematological: Negative.    Psychiatric/Behavioral: Negative.    All other systems reviewed and are negative.      The following portions of the patient's history were reviewed and updated as appropriate: allergies, current medications, past family history, past medical history, past social history, past surgical history and problem  list.    Objective     Physical Exam:  Vital Signs: There were no vitals filed for this visit.    Physical Exam   Constitutional: She is oriented to person, place, and time. She appears well-developed and well-nourished. No distress.   HENT:   Head: Normocephalic and atraumatic.   Pulmonary/Chest: Effort normal. No respiratory distress.   Musculoskeletal: She exhibits no edema.   Neurological: She is alert and oriented to person, place, and time.   Skin: She is not diaphoretic.   Psychiatric: She has a normal mood and affect. Her behavior is normal. Judgment and thought content normal.       Results Review:     Sputum culture 1/3 +VIDAL      CT chest reviewed and compared with older studies in march and Jan.   Right upper lobe small cavitary lesion is stable compared to January CT scan.  Left lower lobe consolidation improved, no new infiltrate.  No new effusions.      Assessment / Plan      Assessment:   Problem List Items Addressed This Visit     None      Visit Diagnoses     Lung nodule    -  Primary    Cigarette nicotine dependence in remission        VIDAL (mycobacterium avium-intracellulare) (CMS/[facility])              Plan:   1.  Patient with persistent cough and sputum production along with cavitary lesion in the right upper lobe.  Repeat CT scan reviewed today and that does not show any change in right upper lobe cavitary lesion which is pretty small compared to 6 months ago.  Reviewed CT scan and I was able to show her the films with the camera as well.  Discussed with patient that we will still need to keep an eye on this lesion and I will recommend 6-month CT scan to follow-up.  She is comfortable with this plan.  Previously she has not wanted PET scan because of cost issues.    2.  Patient is supposed to see infectious disease for follow-up on positive Mycobacterium avium cultures.  Given the stability of her CT scan do not think she will qualify for treatment at this point but will await their  recommendations.    3.  Patient was commended on quitting smoking and vaping.  Advised to stay off of smoking.    4.  Patient  does not think she needs any inhalers or nebulizer as she denies any shortness of breath currently.      Follow Up:   6 months with a repeat CT chest or sooner as needed.    Discussed plan of care in detail with patient today. Patient verbally understands and agrees.      Jesse Kenny MD  Pulmonary Critical Care and Sleep Medicine    Please note that portions of this note may have been completed with a voice recognition program. Efforts were made to edit the dictations, but occasionally words are mistranscribed.

## 2020-06-18 ENCOUNTER — TRANSCRIBE ORDERS (OUTPATIENT)
Dept: LAB | Facility: HOSPITAL | Age: 50
End: 2020-06-18

## 2020-06-18 ENCOUNTER — LAB (OUTPATIENT)
Dept: LAB | Facility: HOSPITAL | Age: 50
End: 2020-06-18

## 2020-06-18 DIAGNOSIS — R06.02 SHORTNESS OF BREATH: ICD-10-CM

## 2020-06-18 DIAGNOSIS — K50.919 CROHN'S DISEASE WITH COMPLICATION, UNSPECIFIED GASTROINTESTINAL TRACT LOCATION (HCC): ICD-10-CM

## 2020-06-18 DIAGNOSIS — A31.0 PULMONARY DISEASE DUE TO MYCOBACTERIA (HCC): Primary | ICD-10-CM

## 2020-06-18 DIAGNOSIS — A31.0 PULMONARY DISEASE DUE TO MYCOBACTERIA (HCC): ICD-10-CM

## 2020-06-18 PROCEDURE — 87205 SMEAR GRAM STAIN: CPT

## 2020-06-18 PROCEDURE — 87116 MYCOBACTERIA CULTURE: CPT

## 2020-06-18 PROCEDURE — 87206 SMEAR FLUORESCENT/ACID STAI: CPT

## 2020-06-18 PROCEDURE — 87070 CULTURE OTHR SPECIMN AEROBIC: CPT

## 2020-06-20 LAB
BACTERIA SPEC RESP CULT: NORMAL
GRAM STN SPEC: NORMAL

## 2020-07-31 LAB
MYCOBACTERIUM SPEC CULT: NORMAL
NIGHT BLUE STAIN TISS: NORMAL

## 2021-01-05 DIAGNOSIS — Z00.6 EXAMINATION FOR NORMAL COMPARISON FOR CLINICAL RESEARCH: Primary | ICD-10-CM

## 2021-01-06 ENCOUNTER — TELEMEDICINE (OUTPATIENT)
Dept: PULMONOLOGY | Facility: CLINIC | Age: 51
End: 2021-01-06

## 2021-01-06 DIAGNOSIS — R05.9 COUGH: ICD-10-CM

## 2021-01-06 DIAGNOSIS — F17.211 CIGARETTE NICOTINE DEPENDENCE IN REMISSION: ICD-10-CM

## 2021-01-06 DIAGNOSIS — R91.1 LUNG NODULE: Primary | ICD-10-CM

## 2021-01-06 DIAGNOSIS — A31.0 MAI (MYCOBACTERIUM AVIUM-INTRACELLULARE) (HCC): ICD-10-CM

## 2021-01-06 PROCEDURE — 99214 OFFICE O/P EST MOD 30 MIN: CPT | Performed by: INTERNAL MEDICINE

## 2021-01-06 NOTE — PROGRESS NOTES
Follow Up Office Visit      Patient Name: Birgit Ramirez    You have chosen to receive care through a telehealth visit.  Do you consent to use a video/audio connection for your medical care today? Yes      Chief Complaint:    Chief Complaint   Patient presents with   • Abnormal Chest X-ray       History of Present Illness: Birgit Ramirez is a 50 y.o. female who is here today for follow up.    Birgit is seen in pulmonary clinic follow-up today.  Patient has previous history of smoking and was vaping for 12 years, currently quit.  Patient was seen here with cough and acute left lower lobe pneumonia.  Patient was treated with outpatient Levaquin and we brought her back to do repeat CT scan.  Left lower lobe consolidation improved but patient was noted to have a right upper lobe cavitary lesion.  Patient subsequently had sputum AFB testing done and that showed 1 out of 3 VIDAL positive.  Patient was referred to infectious disease and due to no significant symptoms decided to monitor for now and repeat sputum testing was done.  Not been started on treatment at this time.  Patient continues to have morning cough with sputum production but denies any hemoptysis.  Denies any chest pain.  Denies any fevers, chills or night sweats.  Denies any change in appetite or weight loss either.  Overall states that she is feeling pretty good.    Repeat CT scan was done on January 4 at [facility].  Radiology report reviewed and showed slowly enlarging 2.5 cm subsolid nodule in the right upper lobe which is increased in size from June of this year.  No significant adenopathy though CT was done without contrast.  Moderate hiatal hernia noted.      Subjective      Review of Systems:   Review of Systems   Constitutional: Negative.    HENT: Negative.    Respiratory: Cough: whitish sputum, intermittently. No hemoptysis.     Cardiovascular: Negative.    Gastrointestinal: Negative.    Endocrine: Negative.    Musculoskeletal:  Negative.    Skin: Negative.    Neurological: Negative.    Hematological: Negative.    Psychiatric/Behavioral: Negative.    All other systems reviewed and are negative.      The following portions of the patient's history were reviewed and updated as appropriate: allergies, current medications, past family history, past medical history, past social history, past surgical history and problem list.    Objective     Physical Exam:  Vital Signs: There were no vitals filed for this visit.    Physical Exam   Constitutional: She is oriented to person, place, and time. She appears well-developed and well-nourished. No distress.   HENT:   Head: Normocephalic and atraumatic.   Pulmonary/Chest: Effort normal. No respiratory distress.   Neurological: She is alert and oriented to person, place, and time.   Skin: She is not diaphoretic.   Psychiatric: Her behavior is normal. Judgment and thought content normal.       Results Review:     Sputum culture 1/3 +VIDAL      CT chest reviewed and compared with older studies in June 2020.   Right upper lobe small cavitary lesion is slightly increased in size.  Left lower lobe atelectasis/scar stable.  No significant adenopathy or effusion noted.  No other suspicious lung nodules noted either.    Assessment / Plan      Assessment:   Problem List Items Addressed This Visit     None      Visit Diagnoses     Lung nodule    -  Primary    Relevant Orders    IR Needle Biopsy    Cough        Cigarette nicotine dependence in remission        VIDAL (mycobacterium avium-intracellulare) (CMS/[facility])              Plan:   1.  Patient with persistent cough and sputum production along with cavitary lesion in the right upper lobe.  VIDAL noted in sputum cultures 1 out of 3.  ID team wants to hold off on treatment until symptoms get worse or repeat cultures are positive.  2.  CT scan is showing slight worsening of the upper lobe nodule.  Discussed with patient that it could still be underlying atypical infection.   Given her extensive smoking history I am also concerned that we may be missing other pathology such as lung malignancy.  At this point since I am not seeing any scattered nodules or granulomatous process I would feel more comfortable if we do a biopsy of this lesion.  I will have her intervention radiology colleagues evaluate the case and see if he can proceed with right upper lobe CT-guided biopsy for pathology and cultures as well.  Proposed procedure discussed in detail with the patient.  Side effects for pneumothorax, bleeding reviewed.  Discussed mostly procedure is outpatient but may require inpatient stay develops any complications.  3.  Patient was commended on quitting smoking and vaping.  Advised to stay off of smoking.  4.  Patient  does not think she needs any inhalers or nebulizer as she denies any shortness of breath currently.      Follow Up:   Schedule 1 week after CT guided biopsy.     Discussed plan of care in detail with patient today. Patient verbally understands and agrees.      Jesse Kenny MD  Pulmonary Critical Care and Sleep Medicine    Please note that portions of this note may have been completed with a voice recognition program. Efforts were made to edit the dictations, but occasionally words are mistranscribed.

## 2021-01-07 DIAGNOSIS — R91.1 LUNG NODULE: Primary | ICD-10-CM

## 2021-01-18 ENCOUNTER — APPOINTMENT (OUTPATIENT)
Dept: PREADMISSION TESTING | Facility: HOSPITAL | Age: 51
End: 2021-01-18

## 2021-01-21 ENCOUNTER — APPOINTMENT (OUTPATIENT)
Dept: CT IMAGING | Facility: HOSPITAL | Age: 51
End: 2021-01-21

## 2021-02-04 ENCOUNTER — DOCUMENTATION (OUTPATIENT)
Dept: INTERNAL MEDICINE | Facility: HOSPITAL | Age: 51
End: 2021-02-04

## 2021-02-05 ENCOUNTER — APPOINTMENT (OUTPATIENT)
Dept: PREADMISSION TESTING | Facility: HOSPITAL | Age: 51
End: 2021-02-05

## 2021-02-05 PROCEDURE — C9803 HOPD COVID-19 SPEC COLLECT: HCPCS

## 2021-02-05 PROCEDURE — U0004 COV-19 TEST NON-CDC HGH THRU: HCPCS

## 2021-02-06 LAB — SARS-COV-2 RNA RESP QL NAA+PROBE: NOT DETECTED

## 2021-02-08 ENCOUNTER — HOSPITAL ENCOUNTER (OUTPATIENT)
Dept: GENERAL RADIOLOGY | Facility: HOSPITAL | Age: 51
Discharge: HOME | End: 2021-02-08

## 2021-02-08 ENCOUNTER — HOSPITAL ENCOUNTER (OUTPATIENT)
Dept: CT IMAGING | Facility: HOSPITAL | Age: 51
Discharge: HOME | End: 2021-02-08

## 2021-02-08 VITALS
RESPIRATION RATE: 18 BRPM | WEIGHT: 209.6 LBS | TEMPERATURE: 97.1 F | HEIGHT: 72 IN | DIASTOLIC BLOOD PRESSURE: 68 MMHG | HEART RATE: 96 BPM | OXYGEN SATURATION: 98 % | BODY MASS INDEX: 28.39 KG/M2 | SYSTOLIC BLOOD PRESSURE: 128 MMHG

## 2021-02-08 DIAGNOSIS — R91.1 LUNG NODULE: ICD-10-CM

## 2021-02-08 LAB
APTT PPP: 31.8 SECONDS (ref 24–37)
INR PPP: 1.05 (ref 0.85–1.16)
PLATELET # BLD AUTO: 190 10*3/MM3 (ref 140–450)
PROTHROMBIN TIME: 13.4 SECONDS (ref 11.5–14)

## 2021-02-08 PROCEDURE — 99152 MOD SED SAME PHYS/QHP 5/>YRS: CPT

## 2021-02-08 PROCEDURE — 88305 TISSUE EXAM BY PATHOLOGIST: CPT | Performed by: NURSE PRACTITIONER

## 2021-02-08 PROCEDURE — 85610 PROTHROMBIN TIME: CPT | Performed by: RADIOLOGY

## 2021-02-08 PROCEDURE — 87205 SMEAR GRAM STAIN: CPT | Performed by: NURSE PRACTITIONER

## 2021-02-08 PROCEDURE — 25010000002 MIDAZOLAM PER 1 MG: Performed by: RADIOLOGY

## 2021-02-08 PROCEDURE — 25010000002 FENTANYL CITRATE (PF) 100 MCG/2ML SOLUTION: Performed by: RADIOLOGY

## 2021-02-08 PROCEDURE — 87070 CULTURE OTHR SPECIMN AEROBIC: CPT | Performed by: NURSE PRACTITIONER

## 2021-02-08 PROCEDURE — 85730 THROMBOPLASTIN TIME PARTIAL: CPT | Performed by: RADIOLOGY

## 2021-02-08 PROCEDURE — 87176 TISSUE HOMOGENIZATION CULTR: CPT | Performed by: NURSE PRACTITIONER

## 2021-02-08 PROCEDURE — 71045 X-RAY EXAM CHEST 1 VIEW: CPT

## 2021-02-08 PROCEDURE — 85049 AUTOMATED PLATELET COUNT: CPT | Performed by: RADIOLOGY

## 2021-02-08 PROCEDURE — 99153 MOD SED SAME PHYS/QHP EA: CPT

## 2021-02-08 RX ORDER — SODIUM CHLORIDE 0.9 % (FLUSH) 0.9 %
3 SYRINGE (ML) INJECTION EVERY 12 HOURS SCHEDULED
Status: DISCONTINUED | OUTPATIENT
Start: 2021-02-08 | End: 2021-02-09 | Stop reason: HOSPADM

## 2021-02-08 RX ORDER — MIDAZOLAM HYDROCHLORIDE 1 MG/ML
INJECTION INTRAMUSCULAR; INTRAVENOUS
Status: DISPENSED
Start: 2021-02-08 | End: 2021-02-08

## 2021-02-08 RX ORDER — FENTANYL CITRATE 50 UG/ML
INJECTION, SOLUTION INTRAMUSCULAR; INTRAVENOUS
Status: DISPENSED
Start: 2021-02-08 | End: 2021-02-08

## 2021-02-08 RX ORDER — FENTANYL CITRATE 50 UG/ML
INJECTION, SOLUTION INTRAMUSCULAR; INTRAVENOUS
Status: COMPLETED | OUTPATIENT
Start: 2021-02-08 | End: 2021-02-08

## 2021-02-08 RX ORDER — LIDOCAINE HYDROCHLORIDE 10 MG/ML
20 INJECTION, SOLUTION EPIDURAL; INFILTRATION; INTRACAUDAL; PERINEURAL ONCE
Status: COMPLETED | OUTPATIENT
Start: 2021-02-08 | End: 2021-02-08

## 2021-02-08 RX ORDER — MIDAZOLAM HYDROCHLORIDE 1 MG/ML
INJECTION INTRAMUSCULAR; INTRAVENOUS
Status: COMPLETED | OUTPATIENT
Start: 2021-02-08 | End: 2021-02-08

## 2021-02-08 RX ADMIN — MIDAZOLAM HYDROCHLORIDE 2 MG: 1 INJECTION, SOLUTION INTRAMUSCULAR; INTRAVENOUS at 08:44

## 2021-02-08 RX ADMIN — MIDAZOLAM HYDROCHLORIDE 1 MG: 1 INJECTION, SOLUTION INTRAMUSCULAR; INTRAVENOUS at 08:50

## 2021-02-08 RX ADMIN — MIDAZOLAM HYDROCHLORIDE 1 MG: 1 INJECTION, SOLUTION INTRAMUSCULAR; INTRAVENOUS at 09:06

## 2021-02-08 RX ADMIN — FENTANYL CITRATE 50 MCG: 0.05 INJECTION, SOLUTION INTRAMUSCULAR; INTRAVENOUS at 08:43

## 2021-02-08 RX ADMIN — LIDOCAINE HYDROCHLORIDE 20 ML: 10 INJECTION, SOLUTION EPIDURAL; INFILTRATION; INTRACAUDAL; PERINEURAL at 08:51

## 2021-02-08 RX ADMIN — FENTANYL CITRATE 50 MCG: 0.05 INJECTION, SOLUTION INTRAMUSCULAR; INTRAVENOUS at 08:50

## 2021-02-08 RX ADMIN — FENTANYL CITRATE 50 MCG: 0.05 INJECTION, SOLUTION INTRAMUSCULAR; INTRAVENOUS at 09:05

## 2021-02-08 NOTE — H&P
History and Physical      Chief complaint cough    Subjective       Persistent cough and sputum production along with cavitary lesion in the right upper lobe.     Review of Systems   Not reviewed     History  Past Medical History:   Diagnosis Date   • Crohn's disease (CMS/HCC)      Past Surgical History:   Procedure Laterality Date   • CHOLECYSTECTOMY     • COLON RESECTION     • COLONOSCOPY       Family History   Problem Relation Age of Onset   • Crohn's disease Mother    • Cancer Mother    • Diabetes Father    • Hypertension Father    • Hyperlipidemia Father    • Peripheral vascular disease Father      Social History     Tobacco Use   • Smoking status: Former Smoker     Packs/day: 0.50     Years: 18.00     Pack years: 9.00     Types: Cigarettes, Electronic Cigarette     Quit date:      Years since quittin.1   • Smokeless tobacco: Never Used   • Tobacco comment: quit cigarettes, still vaping   Substance Use Topics   • Alcohol use: No   • Drug use: No     (Not in a hospital admission)    Allergies:  Patient has no known allergies.    Objective     Vital Signs  Temp:  [97.1 °F (36.2 °C)] 97.1 °F (36.2 °C)  Heart Rate:  [79-90] 83  Resp:  [18-20] 20  BP: (120-143)/(62-86) 125/78    Physical Exam:    No exam performed today,    Results Review:    I reviewed the patient's new clinical results.  I reviewed the patient's new imaging results and agree with the interpretation.    Assessment/Plan       * No active hospital problems. *      Persistent cough and sputum production along with cavitary lesion in the right upper lobe  Smoking history    Planned image guided lung nodule biopsy    Murray Orozco MD  21  09:18 EST

## 2021-02-08 NOTE — NURSING NOTE
Right lung biopsy performed by Dr Orozco.  Pt tolerated well.  CXR ordered one hour post procedure.  Report called to GORDON.

## 2021-02-08 NOTE — POST-PROCEDURE NOTE
An immediate patient assessment was done prior to the administration of moderate and/or deep conscious sedation.      Birgit Ramirez      Pre-op Diagnosis:   Persistent cough and sputum production along with cavitary lesion in the right upper lobe  Post-op diagnosis:  Same        Anesthesia: Moderate sedation    ASA SCALE ASSESSMENT:  2-Mild to moderate systemic disease, medically well controlled, with no functional limitation    MALLAMPATI CLASSIFICATION:  2-Able to visualize the soft palate, fauces, uvula. The anterior & posterior tonsilar pillars are hidden by the tongue.    Staff:   Murray Orozco MD      Estimated Blood Loss: Trace    Urine Voided: * No values recorded between 2/8/2021 12:00 AM and 2/8/2021  9:23 AM *    Specimens:                18 gauge core X 5 passes in formalin. Fragment in saline for microbiology      Drains:  None    Findings: Right upper lobe lung cavitary lung nodule    Complications: No immediate      Murray Orozco MD     Date: 2/8/2021  Time: 09:23 EST

## 2021-02-09 ENCOUNTER — TELEPHONE (OUTPATIENT)
Dept: INFUSION THERAPY | Facility: HOSPITAL | Age: 51
End: 2021-02-09

## 2021-02-09 LAB
CYTO UR: NORMAL
LAB AP CASE REPORT: NORMAL
LAB AP CLINICAL INFORMATION: NORMAL
PATH REPORT.FINAL DX SPEC: NORMAL
PATH REPORT.GROSS SPEC: NORMAL

## 2021-02-11 LAB
BACTERIA SPEC AEROBE CULT: NORMAL
GRAM STN SPEC: NORMAL
GRAM STN SPEC: NORMAL

## 2021-02-14 ENCOUNTER — MDT ASSESSMENT (OUTPATIENT)
Dept: ONCOLOGY | Facility: CLINIC | Age: 51
End: 2021-02-14

## 2021-02-14 NOTE — PROGRESS NOTES
CANCER CONFERENCE AGENDA  DATE:  2021      SPECIALTY:  Thoracic   PRESENTER:  Lyly Childress M.D.  SITE:   Lung        HPI:  50 YOWF who presented with hemoptysis with coughing and yellowish-green sputum.  Patient has lost 10 pounds within one week (no appetite).       REFERRING PROVIDER:  Jose Raines MD. ([facility] - [place], [place])       PLACE OF RESIDENCE:  [place], [place]       SOCIAL HISTORY:  Current every smoker 0.5 PPD x18 years (E-cigarettes).  She is single and employed ( for [employer]ology [employer])       FAMILY HISTORY:  Non-contributory       PAST MEDICAL HISTORY:  Crohn’s disease.       PAST SURGICAL HISTORY:  Colon resection, cholecystectomy, colonoscopy (2016).    PFTs (01/10/2020):  FEV1 - 73%               DLCO - 71%     NEXT GEN SEQUENCING:  Not available     IMAGIN2021 ([facility]):  CT chest - Report not available.     CLINICAL STAGE:    T 1b, N 0, M 0 and stage IA2     SURGICAL PROCEDURE: Not available     PATHOLOGY: 2021 ([facility]):  Right lung mass needle core biopsy - Invasive adenocarcinoma with background lepidic pattern      TREATMENT PLAN DISCUSSION:      Clinical Trials: No        Treatment Recommendations/Referrals: Referral to medical oncology; PET/CT possible MRI brain; CyberKnife vs surgery     EVIDENCE BASED NATIONAL TREATMENT GUIDELINES:  National Comprehensive Cancer Network      Please discuss clinical/working stage, TNM, Stage Group, National Treatment Guidelines, and Prognostic Indicators.      Privileged and Confidential Patient Safety Work Product:  The information contained herein has been compiled as part of the [facility] Patient Safety Evaluation System and is deemed to be a Patient Safety Work Product and is privileged and confidential.  Disclaimer:  Multidisciplinary cancer conferences provide consultative services to formulate an effective treatment plan for patients and include physician  representation from medical oncology, radiation oncology, radiology, surgery, and pathology.  AJCC or other appropriate staging is discussed, along with site-specific prognostic indicators and treatment planning used by evidence-based treatment guidelines.  Treatment plans which are discussed during conference may/may not necessarily be followed by the patient's managing physician(s), as there may be other factors taken into consideration which impact the treatment plan.  The specific treatment plan is ultimately determined on an individual basis by the patient and the physician(s) involved in his/her care.

## 2021-02-17 DIAGNOSIS — C34.91 PRIMARY ADENOCARCINOMA OF RIGHT LUNG (HCC): Primary | ICD-10-CM

## 2021-02-17 DIAGNOSIS — R91.1 LUNG NODULE: ICD-10-CM

## 2021-02-17 NOTE — PROGRESS NOTES
Reviewed pathology report and showing infiltrating adenocarcinoma with lipidic pattern.  Discussed report with patient.  She is also showing gram-negative bacilli on Gram stain but cultures are negative thus far.  Her PFTs are reasonable with FEV1 of 70% predicted.  We will go ahead and do staging work-up including PET scan and MRI brain.  If no evidence of distant metastatic disease will refer to CT surgery for resection.  Patient verbalized understanding and had no further questions.  Set her up studies soon and follow-up after.

## 2021-03-02 ENCOUNTER — HOSPITAL ENCOUNTER (OUTPATIENT)
Dept: PET IMAGING | Facility: HOSPITAL | Age: 51
Discharge: HOME | End: 2021-03-02

## 2021-03-02 ENCOUNTER — HOSPITAL ENCOUNTER (OUTPATIENT)
Dept: MRI IMAGING | Facility: HOSPITAL | Age: 51
Discharge: HOME | End: 2021-03-02

## 2021-03-02 DIAGNOSIS — R91.1 LUNG NODULE: ICD-10-CM

## 2021-03-02 DIAGNOSIS — C34.91 PRIMARY ADENOCARCINOMA OF RIGHT LUNG (HCC): ICD-10-CM

## 2021-03-02 LAB — GLUCOSE BLDC GLUCOMTR-MCNC: 91 MG/DL (ref 70–130)

## 2021-03-02 PROCEDURE — 0 GADOBENATE DIMEGLUMINE 529 MG/ML SOLUTION: Performed by: INTERNAL MEDICINE

## 2021-03-02 PROCEDURE — A9552 F18 FDG: HCPCS | Performed by: INTERNAL MEDICINE

## 2021-03-02 PROCEDURE — 78815 PET IMAGE W/CT SKULL-THIGH: CPT

## 2021-03-02 PROCEDURE — 0 FLUDEOXYGLUCOSE F18 SOLUTION: Performed by: INTERNAL MEDICINE

## 2021-03-02 PROCEDURE — 82962 GLUCOSE BLOOD TEST: CPT

## 2021-03-02 PROCEDURE — A9577 INJ MULTIHANCE: HCPCS | Performed by: INTERNAL MEDICINE

## 2021-03-02 PROCEDURE — 70553 MRI BRAIN STEM W/O & W/DYE: CPT

## 2021-03-02 RX ADMIN — FLUDEOXYGLUCOSE F18 1 DOSE: 300 INJECTION INTRAVENOUS at 13:30

## 2021-03-02 RX ADMIN — GADOBENATE DIMEGLUMINE 20 ML: 529 INJECTION, SOLUTION INTRAVENOUS at 15:43

## 2021-03-03 DIAGNOSIS — C34.91 ADENOCARCINOMA OF RIGHT LUNG, STAGE 1 (HCC): Primary | ICD-10-CM

## 2021-03-03 NOTE — PROGRESS NOTES
Discussed PET scan and MRI reports with the patient. NO definite evidence of metastatic disease. Likely stage 1A2 disease. PFT acceptable. Will refer to CT surgery for potential resection. Pt verbalized understanding and willing to proceed. Pt had no further questions.

## 2021-03-09 ENCOUNTER — OFFICE VISIT (OUTPATIENT)
Dept: CARDIAC SURGERY | Facility: CLINIC | Age: 51
End: 2021-03-09

## 2021-03-09 VITALS
BODY MASS INDEX: 28.17 KG/M2 | HEART RATE: 101 BPM | DIASTOLIC BLOOD PRESSURE: 72 MMHG | WEIGHT: 208 LBS | HEIGHT: 72 IN | SYSTOLIC BLOOD PRESSURE: 118 MMHG | TEMPERATURE: 97.8 F | OXYGEN SATURATION: 98 %

## 2021-03-09 DIAGNOSIS — C34.11 MALIGNANT NEOPLASM OF UPPER LOBE OF RIGHT LUNG (HCC): Primary | ICD-10-CM

## 2021-03-09 PROCEDURE — 99204 OFFICE O/P NEW MOD 45 MIN: CPT | Performed by: THORACIC SURGERY (CARDIOTHORACIC VASCULAR SURGERY)

## 2021-03-09 NOTE — PROGRESS NOTES
2021  Patient Information  Birgit Ramirez                                                                                          [address] [facility] [place] [address]   1970  'PCP/Referring Physician'  Jose Raines MD  [phone]  Jesse Kenny MD  [phone]  Chief Complaint   Patient presents with   • Consult     New patient per Dr. Jesse Kenny for right lung cancer. Pt states that she is feeling fine, has no compliants.        History of Present Illness: 51-year-old  female with a history of Crohn's disease and previous tobacco abuse who presents with a newly diagnosed right upper lobe lung cancer.  The patient was initially diagnosed with pneumonia in January after a cough with sputum production and was treated with antibiotics.  CT scan demonstrated a right lung nodule and a subsequent CT-guided needle biopsy demonstrated invasive adenocarcinoma.  The patient denies cough, hemoptysis, weight loss, lymphadenopathy, fevers or chills.      There is no problem list on file for this patient.    Past Medical History:   Diagnosis Date   • Crohn's disease (CMS/HCC)    • Depression      Past Surgical History:   Procedure Laterality Date   •  SECTION      2x   • CHOLECYSTECTOMY     • COLON RESECTION     • COLONOSCOPY         Current Outpatient Medications:   •  Cyanocobalamin (VITAMIN B-12 IJ), Inject  as directed Every 30 (Thirty) Days., Disp: , Rfl:   •  sertraline (ZOLOFT) 50 MG tablet, , Disp: , Rfl:   •  albuterol sulfate  (90 Base) MCG/ACT inhaler, , Disp: , Rfl:   No Known Allergies  Social History     Socioeconomic History   • Marital status:      Spouse name: Not on file   • Number of children: 2   • Years of education: Not on file   • Highest education level: Not on file   Tobacco Use   • Smoking status: Former Smoker     Packs/day: 0.50     Years: 18.00     Pack years: 9.00     Types: Cigarettes, Electronic Cigarette     Quit date: 2006     Years  "since quitting: 15.1   • Smokeless tobacco: Never Used   • Tobacco comment: quit cigarettes 2006, vaped 10 years quit jan 2020   Vaping Use   • Vaping Use: Former   • Quit date: 1/1/2020   Substance and Sexual Activity   • Alcohol use: No   • Drug use: No   • Sexual activity: Defer     Family History   Problem Relation Age of Onset   • Crohn's disease Mother    • Cancer Mother    • Diabetes Father    • Hypertension Father    • Hyperlipidemia Father    • Peripheral vascular disease Father      Review of Systems   Constitutional: Negative for chills, fever, malaise/fatigue, night sweats and weight loss.   HENT: Negative for congestion, hearing loss, nosebleeds and odynophagia.    Cardiovascular: Negative for chest pain, claudication, dyspnea on exertion, leg swelling, orthopnea, palpitations and syncope.   Respiratory: Negative for cough, hemoptysis, shortness of breath and wheezing.    Endocrine: Negative for cold intolerance, heat intolerance, polydipsia, polyphagia and polyuria.   Hematologic/Lymphatic: Does not bruise/bleed easily.   Skin: Negative for itching, poor wound healing and rash.   Musculoskeletal: Positive for joint pain (right knee ). Negative for arthritis, back pain, joint swelling and myalgias.   Gastrointestinal: Positive for diarrhea. Negative for abdominal pain, constipation, hematemesis, melena, nausea and vomiting.   Genitourinary: Negative for dysuria, frequency, hematuria, nocturia and urgency.   Neurological: Negative for dizziness, light-headedness, loss of balance and numbness.   Psychiatric/Behavioral: Positive for depression. Negative for suicidal ideas. The patient is nervous/anxious.    Allergic/Immunologic: Positive for environmental allergies. Negative for HIV exposure.     Vitals:    03/09/21 1000   BP: 118/72   Pulse: 101   Temp: 97.8 °F (36.6 °C)   SpO2: 98%   Weight: 94.3 kg (208 lb)   Height: 182.9 cm (72\")      Physical Exam  Constitutional:       General: She is not in acute " distress.     Appearance: She is well-developed. She is not diaphoretic.      Comments:  female who appears stated age   HENT:      Head: Normocephalic and atraumatic.   Eyes:      General: No scleral icterus.     Conjunctiva/sclera: Conjunctivae normal.   Neck:      Vascular: No JVD.      Trachea: No tracheal deviation.      Comments: No carotid bruits bilaterally  Cardiovascular:      Rate and Rhythm: Normal rate and regular rhythm.      Heart sounds: Normal heart sounds. No murmur. No friction rub. No gallop.    Pulmonary:      Effort: Pulmonary effort is normal. No respiratory distress.      Breath sounds: Normal breath sounds. No wheezing or rales.   Abdominal:      General: There is no distension.      Palpations: Abdomen is soft. There is no mass.      Tenderness: There is no abdominal tenderness. There is no guarding or rebound.   Musculoskeletal:         General: Normal range of motion.      Cervical back: Neck supple.   Lymphadenopathy:      Cervical: No cervical adenopathy.      Upper Body:      Right upper body: No supraclavicular or axillary adenopathy.      Left upper body: No supraclavicular or axillary adenopathy.   Skin:     General: Skin is warm and dry.      Findings: No erythema or rash.   Neurological:      Mental Status: She is alert and oriented to person, place, and time.   Psychiatric:         Behavior: Behavior normal.         Thought Content: Thought content normal.         Judgment: Judgment normal.         The ROS, past medical history, surgical history, family history, social history and vitals were reviewed by myself and corrected as needed.      Labs/Imaging:  -CT-guided needle biopsy of the right upper lobe lung nodule performed 2/8/2021, invasive adenocarcinoma  -MRI brain with and without contrast performed 3/2/2021, per report, demonstrates no evidence of metastatic disease  -PET/CT performed 3/2/2021, personally reviewed, demonstrates a 19 mm right upper lobe nodule with  an SUV of 2.5.  No hypermetabolic hilar or mediastinal lymphadenopathy.  -Pulmonary function test performed 1/10/2020, FEV1 2.40 (70% predicted), DLCO 71% predicted    Assessment/Plan:  51-year-old  female with a history of Crohn's disease and previous tobacco abuse who presents with a newly diagnosed 1.9 cm right upper lobe lung cancer.  Tentatively, this represents a [id number] Clinical stage IA2 malignancy.  I discussed with the patient performing bronchoscopy, right VATS, right upper lobectomy, mediastinal lymph node dissection and intercostal nerve blocks.  The risks and benefits of surgery were discussed with the patient including pain, bleeding, infection, air leak, myocardial infarction and death.  The patient understood these risks and wished to proceed with surgery.    There is no problem list on file for this patient.

## 2021-03-10 ENCOUNTER — PREP FOR SURGERY (OUTPATIENT)
Dept: OTHER | Facility: HOSPITAL | Age: 51
End: 2021-03-10

## 2021-03-10 DIAGNOSIS — C34.11 MALIGNANT NEOPLASM OF UPPER LOBE OF RIGHT LUNG (HCC): Primary | ICD-10-CM

## 2021-03-10 RX ORDER — CHLORHEXIDINE GLUCONATE 500 MG/1
1 CLOTH TOPICAL EVERY 12 HOURS PRN
Status: CANCELLED | OUTPATIENT
Start: 2021-03-17

## 2021-03-17 ENCOUNTER — HOSPITAL ENCOUNTER (OUTPATIENT)
Dept: PULMONOLOGY | Facility: HOSPITAL | Age: 51
Discharge: HOME | End: 2021-03-17

## 2021-03-17 ENCOUNTER — HOSPITAL ENCOUNTER (OUTPATIENT)
Dept: GENERAL RADIOLOGY | Facility: HOSPITAL | Age: 51
Discharge: HOME | End: 2021-03-17

## 2021-03-17 ENCOUNTER — APPOINTMENT (OUTPATIENT)
Dept: PREADMISSION TESTING | Facility: HOSPITAL | Age: 51
End: 2021-03-17

## 2021-03-17 ENCOUNTER — ANESTHESIA EVENT (OUTPATIENT)
Dept: PERIOP | Facility: HOSPITAL | Age: 51
End: 2021-03-17

## 2021-03-17 VITALS — BODY MASS INDEX: 28.17 KG/M2 | WEIGHT: 208 LBS | HEIGHT: 72 IN

## 2021-03-17 DIAGNOSIS — C34.11 MALIGNANT NEOPLASM OF UPPER LOBE OF RIGHT LUNG (HCC): ICD-10-CM

## 2021-03-17 LAB
ABO GROUP BLD: NORMAL
ALBUMIN SERPL-MCNC: 4 G/DL (ref 3.5–5.2)
ALP SERPL-CCNC: 89 U/L (ref 39–117)
ALT SERPL W P-5'-P-CCNC: 13 U/L (ref 1–33)
ANION GAP SERPL CALCULATED.3IONS-SCNC: 10 MMOL/L (ref 5–15)
AST SERPL-CCNC: 16 U/L (ref 1–32)
BASOPHILS # BLD AUTO: 0.04 10*3/MM3 (ref 0–0.2)
BASOPHILS NFR BLD AUTO: 0.7 % (ref 0–1.5)
BILIRUB CONJ SERPL-MCNC: <0.2 MG/DL (ref 0–0.3)
BILIRUB INDIRECT SERPL-MCNC: ABNORMAL MG/DL
BILIRUB SERPL-MCNC: 0.5 MG/DL (ref 0–1.2)
BLD GP AB SCN SERPL QL: NEGATIVE
BUN SERPL-MCNC: 7 MG/DL (ref 6–20)
BUN/CREAT SERPL: 9.9 (ref 7–25)
CALCIUM SPEC-SCNC: 8.2 MG/DL (ref 8.6–10.5)
CHLORIDE SERPL-SCNC: 106 MMOL/L (ref 98–107)
CO2 SERPL-SCNC: 25 MMOL/L (ref 22–29)
CREAT SERPL-MCNC: 0.71 MG/DL (ref 0.57–1)
DEPRECATED RDW RBC AUTO: 43.8 FL (ref 37–54)
EOSINOPHIL # BLD AUTO: 0.1 10*3/MM3 (ref 0–0.4)
EOSINOPHIL NFR BLD AUTO: 1.9 % (ref 0.3–6.2)
ERYTHROCYTE [DISTWIDTH] IN BLOOD BY AUTOMATED COUNT: 12.8 % (ref 12.3–15.4)
GFR SERPL CREATININE-BSD FRML MDRD: 86 ML/MIN/1.73
GLUCOSE SERPL-MCNC: 90 MG/DL (ref 65–99)
HBA1C MFR BLD: 5.4 % (ref 4.8–5.6)
HCT VFR BLD AUTO: 38.2 % (ref 34–46.6)
HGB BLD-MCNC: 12 G/DL (ref 12–15.9)
IMM GRANULOCYTES # BLD AUTO: 0.02 10*3/MM3 (ref 0–0.05)
IMM GRANULOCYTES NFR BLD AUTO: 0.4 % (ref 0–0.5)
INR PPP: 1 (ref 0.85–1.16)
LYMPHOCYTES # BLD AUTO: 1.31 10*3/MM3 (ref 0.7–3.1)
LYMPHOCYTES NFR BLD AUTO: 24.3 % (ref 19.6–45.3)
MCH RBC QN AUTO: 29.3 PG (ref 26.6–33)
MCHC RBC AUTO-ENTMCNC: 31.4 G/DL (ref 31.5–35.7)
MCV RBC AUTO: 93.4 FL (ref 79–97)
MONOCYTES # BLD AUTO: 0.52 10*3/MM3 (ref 0.1–0.9)
MONOCYTES NFR BLD AUTO: 9.7 % (ref 5–12)
NEUTROPHILS NFR BLD AUTO: 3.39 10*3/MM3 (ref 1.7–7)
NEUTROPHILS NFR BLD AUTO: 63 % (ref 42.7–76)
NRBC BLD AUTO-RTO: 0 /100 WBC (ref 0–0.2)
PLATELET # BLD AUTO: 187 10*3/MM3 (ref 140–450)
PMV BLD AUTO: 10.6 FL (ref 6–12)
POTASSIUM SERPL-SCNC: 4.2 MMOL/L (ref 3.5–5.2)
PROT SERPL-MCNC: 5.8 G/DL (ref 6–8.5)
PROTHROMBIN TIME: 12.9 SECONDS (ref 11.5–14)
QT INTERVAL: 394 MS
QTC INTERVAL: 492 MS
RBC # BLD AUTO: 4.09 10*6/MM3 (ref 3.77–5.28)
RH BLD: POSITIVE
SARS-COV-2 RNA RESP QL NAA+PROBE: NOT DETECTED
SODIUM SERPL-SCNC: 141 MMOL/L (ref 136–145)
T&S EXPIRATION DATE: NORMAL
WBC # BLD AUTO: 5.38 10*3/MM3 (ref 3.4–10.8)

## 2021-03-17 PROCEDURE — U0003 INFECTIOUS AGENT DETECTION BY NUCLEIC ACID (DNA OR RNA); SEVERE ACUTE RESPIRATORY SYNDROME CORONAVIRUS 2 (SARS-COV-2) (CORONAVIRUS DISEASE [COVID-19]), AMPLIFIED PROBE TECHNIQUE, MAKING USE OF HIGH THROUGHPUT TECHNOLOGIES AS DESCRIBED BY CMS-2020-01-R: HCPCS

## 2021-03-17 PROCEDURE — 93005 ELECTROCARDIOGRAM TRACING: CPT

## 2021-03-17 PROCEDURE — 86901 BLOOD TYPING SEROLOGIC RH(D): CPT

## 2021-03-17 PROCEDURE — 71046 X-RAY EXAM CHEST 2 VIEWS: CPT

## 2021-03-17 PROCEDURE — 86900 BLOOD TYPING SEROLOGIC ABO: CPT

## 2021-03-17 PROCEDURE — 83036 HEMOGLOBIN GLYCOSYLATED A1C: CPT

## 2021-03-17 PROCEDURE — 36415 COLL VENOUS BLD VENIPUNCTURE: CPT

## 2021-03-17 PROCEDURE — 80048 BASIC METABOLIC PNL TOTAL CA: CPT

## 2021-03-17 PROCEDURE — 94010 BREATHING CAPACITY TEST: CPT | Performed by: INTERNAL MEDICINE

## 2021-03-17 PROCEDURE — 85025 COMPLETE CBC W/AUTO DIFF WBC: CPT

## 2021-03-17 PROCEDURE — C9803 HOPD COVID-19 SPEC COLLECT: HCPCS

## 2021-03-17 PROCEDURE — 86923 COMPATIBILITY TEST ELECTRIC: CPT

## 2021-03-17 PROCEDURE — 93010 ELECTROCARDIOGRAM REPORT: CPT | Performed by: INTERNAL MEDICINE

## 2021-03-17 PROCEDURE — 86850 RBC ANTIBODY SCREEN: CPT

## 2021-03-17 PROCEDURE — 94010 BREATHING CAPACITY TEST: CPT

## 2021-03-17 PROCEDURE — 85610 PROTHROMBIN TIME: CPT

## 2021-03-17 PROCEDURE — 80076 HEPATIC FUNCTION PANEL: CPT

## 2021-03-17 NOTE — PAT
EKG cleared by Dr. Mckinney    Patient to apply Chlorhexadine wipes  to surgical area (as instructed) the night before procedure and the AM of procedure. Wipes provided.    Blood bank bracelet applied to patient during Pre Admission Testing visit.  Patient instructed not to remove from arm until after procedure and they are discharged from the hospital.  Explained to patient that they may shower and get the bracelet wet, but not to immerse under water for longer periods (bathing, swimming, hand dishwashing, etc).  Patient verbalized understanding.

## 2021-03-18 ENCOUNTER — HOSPITAL ENCOUNTER (INPATIENT)
Facility: HOSPITAL | Age: 51
LOS: 4 days | Discharge: HOME | End: 2021-03-22
Attending: THORACIC SURGERY (CARDIOTHORACIC VASCULAR SURGERY) | Admitting: THORACIC SURGERY (CARDIOTHORACIC VASCULAR SURGERY)

## 2021-03-18 ENCOUNTER — ANESTHESIA (OUTPATIENT)
Dept: PERIOP | Facility: HOSPITAL | Age: 51
End: 2021-03-18

## 2021-03-18 ENCOUNTER — APPOINTMENT (OUTPATIENT)
Dept: GENERAL RADIOLOGY | Facility: HOSPITAL | Age: 51
End: 2021-03-18

## 2021-03-18 DIAGNOSIS — C34.11 MALIGNANT NEOPLASM OF UPPER LOBE OF RIGHT LUNG (HCC): ICD-10-CM

## 2021-03-18 LAB
ABO GROUP BLD: NORMAL
B-HCG UR QL: NEGATIVE
INTERNAL NEGATIVE CONTROL: NEGATIVE
INTERNAL POSITIVE CONTROL: POSITIVE
Lab: NORMAL
RH BLD: POSITIVE

## 2021-03-18 PROCEDURE — 88305 TISSUE EXAM BY PATHOLOGIST: CPT | Performed by: THORACIC SURGERY (CARDIOTHORACIC VASCULAR SURGERY)

## 2021-03-18 PROCEDURE — 0BJ08ZZ INSPECTION OF TRACHEOBRONCHIAL TREE, VIA NATURAL OR ARTIFICIAL OPENING ENDOSCOPIC: ICD-10-PCS | Performed by: THORACIC SURGERY (CARDIOTHORACIC VASCULAR SURGERY)

## 2021-03-18 PROCEDURE — 86900 BLOOD TYPING SEROLOGIC ABO: CPT

## 2021-03-18 PROCEDURE — 32663 THORACOSCOPY W/LOBECTOMY: CPT | Performed by: THORACIC SURGERY (CARDIOTHORACIC VASCULAR SURGERY)

## 2021-03-18 PROCEDURE — 0BTC4ZZ RESECTION OF RIGHT UPPER LUNG LOBE, PERCUTANEOUS ENDOSCOPIC APPROACH: ICD-10-PCS | Performed by: THORACIC SURGERY (CARDIOTHORACIC VASCULAR SURGERY)

## 2021-03-18 PROCEDURE — C2618 PROBE/NEEDLE, CRYO: HCPCS | Performed by: THORACIC SURGERY (CARDIOTHORACIC VASCULAR SURGERY)

## 2021-03-18 PROCEDURE — 25010000002 PROPOFOL 10 MG/ML EMULSION: Performed by: NURSE ANESTHETIST, CERTIFIED REGISTERED

## 2021-03-18 PROCEDURE — 88309 TISSUE EXAM BY PATHOLOGIST: CPT | Performed by: THORACIC SURGERY (CARDIOTHORACIC VASCULAR SURGERY)

## 2021-03-18 PROCEDURE — 25010000002 MIDAZOLAM PER 1 MG: Performed by: ANESTHESIOLOGY

## 2021-03-18 PROCEDURE — 25010000002 HYDROMORPHONE PER 4 MG: Performed by: ANESTHESIOLOGY

## 2021-03-18 PROCEDURE — 99232 SBSQ HOSP IP/OBS MODERATE 35: CPT | Performed by: INTERNAL MEDICINE

## 2021-03-18 PROCEDURE — 88332 PATH CONSLTJ SURG EA ADD BLK: CPT | Performed by: PATHOLOGY

## 2021-03-18 PROCEDURE — 32663 THORACOSCOPY W/LOBECTOMY: CPT | Performed by: PHYSICIAN ASSISTANT

## 2021-03-18 PROCEDURE — 32674 THORACOSCOPY LYMPH NODE EXC: CPT | Performed by: THORACIC SURGERY (CARDIOTHORACIC VASCULAR SURGERY)

## 2021-03-18 PROCEDURE — 25010000002 CEFUROXIME: Performed by: PHYSICIAN ASSISTANT

## 2021-03-18 PROCEDURE — 25010000002 DEXAMETHASONE PER 1 MG: Performed by: NURSE ANESTHETIST, CERTIFIED REGISTERED

## 2021-03-18 PROCEDURE — 07B74ZZ EXCISION OF THORAX LYMPHATIC, PERCUTANEOUS ENDOSCOPIC APPROACH: ICD-10-PCS | Performed by: THORACIC SURGERY (CARDIOTHORACIC VASCULAR SURGERY)

## 2021-03-18 PROCEDURE — 25010000002 MORPHINE PER 10 MG: Performed by: THORACIC SURGERY (CARDIOTHORACIC VASCULAR SURGERY)

## 2021-03-18 PROCEDURE — 71045 X-RAY EXAM CHEST 1 VIEW: CPT

## 2021-03-18 PROCEDURE — 88311 DECALCIFY TISSUE: CPT | Performed by: THORACIC SURGERY (CARDIOTHORACIC VASCULAR SURGERY)

## 2021-03-18 PROCEDURE — 81025 URINE PREGNANCY TEST: CPT | Performed by: ANESTHESIOLOGY

## 2021-03-18 PROCEDURE — 25010000002 KETOROLAC TROMETHAMINE PER 15 MG: Performed by: THORACIC SURGERY (CARDIOTHORACIC VASCULAR SURGERY)

## 2021-03-18 PROCEDURE — 25010000002 FENTANYL CITRATE (PF) 100 MCG/2ML SOLUTION: Performed by: NURSE ANESTHETIST, CERTIFIED REGISTERED

## 2021-03-18 PROCEDURE — 25010000003 CEFUROXIME SODIUM 1.5 G RECONSTITUTED SOLUTION: Performed by: PHYSICIAN ASSISTANT

## 2021-03-18 PROCEDURE — 31622 DX BRONCHOSCOPE/WASH: CPT | Performed by: THORACIC SURGERY (CARDIOTHORACIC VASCULAR SURGERY)

## 2021-03-18 PROCEDURE — 25010000002 ONDANSETRON PER 1 MG: Performed by: NURSE ANESTHETIST, CERTIFIED REGISTERED

## 2021-03-18 PROCEDURE — 86901 BLOOD TYPING SEROLOGIC RH(D): CPT

## 2021-03-18 PROCEDURE — 88331 PATH CONSLTJ SURG 1 BLK 1SPC: CPT | Performed by: PATHOLOGY

## 2021-03-18 PROCEDURE — 25010000002 NEOSTIGMINE 10 MG/10ML SOLUTION: Performed by: NURSE ANESTHETIST, CERTIFIED REGISTERED

## 2021-03-18 PROCEDURE — 3E0T3BZ INTRODUCTION OF ANESTHETIC AGENT INTO PERIPHERAL NERVES AND PLEXI, PERCUTANEOUS APPROACH: ICD-10-PCS | Performed by: THORACIC SURGERY (CARDIOTHORACIC VASCULAR SURGERY)

## 2021-03-18 DEVICE — ARTICULATION RELOAD WITH TRI-STAPLE TECHNOLOGY
Type: IMPLANTABLE DEVICE | Site: LUNG | Status: FUNCTIONAL
Brand: ENDO GIA

## 2021-03-18 DEVICE — CURVED TIP INTELLIGENT RELOAD AND INTRODUCER
Type: IMPLANTABLE DEVICE | Site: LUNG | Status: FUNCTIONAL
Brand: TRI-STAPLE 2.0

## 2021-03-18 DEVICE — ARTICULATING RELOAD WITH TRI-STAPLE TECHNOLOGY
Type: IMPLANTABLE DEVICE | Site: LUNG | Status: FUNCTIONAL
Brand: ENDO GIA

## 2021-03-18 DEVICE — BLACK INTELLIGENT RELOAD
Type: IMPLANTABLE DEVICE | Site: LUNG | Status: FUNCTIONAL
Brand: TRI-STAPLE 2.0

## 2021-03-18 RX ORDER — MORPHINE SULFATE 4 MG/ML
2 INJECTION, SOLUTION INTRAMUSCULAR; INTRAVENOUS
Status: DISCONTINUED | OUTPATIENT
Start: 2021-03-18 | End: 2021-03-22 | Stop reason: HOSPADM

## 2021-03-18 RX ORDER — DEXAMETHASONE SODIUM PHOSPHATE 4 MG/ML
INJECTION, SOLUTION INTRA-ARTICULAR; INTRALESIONAL; INTRAMUSCULAR; INTRAVENOUS; SOFT TISSUE AS NEEDED
Status: DISCONTINUED | OUTPATIENT
Start: 2021-03-18 | End: 2021-03-18 | Stop reason: SURG

## 2021-03-18 RX ORDER — HEPARIN SODIUM 5000 [USP'U]/ML
5000 INJECTION, SOLUTION INTRAVENOUS; SUBCUTANEOUS EVERY 12 HOURS SCHEDULED
Status: DISCONTINUED | OUTPATIENT
Start: 2021-03-19 | End: 2021-03-22 | Stop reason: HOSPADM

## 2021-03-18 RX ORDER — ONDANSETRON 2 MG/ML
4 INJECTION INTRAMUSCULAR; INTRAVENOUS EVERY 6 HOURS PRN
Status: DISCONTINUED | OUTPATIENT
Start: 2021-03-18 | End: 2021-03-22 | Stop reason: HOSPADM

## 2021-03-18 RX ORDER — AMOXICILLIN 250 MG
2 CAPSULE ORAL 2 TIMES DAILY
Status: DISCONTINUED | OUTPATIENT
Start: 2021-03-18 | End: 2021-03-22 | Stop reason: HOSPADM

## 2021-03-18 RX ORDER — PROPOFOL 10 MG/ML
VIAL (ML) INTRAVENOUS AS NEEDED
Status: DISCONTINUED | OUTPATIENT
Start: 2021-03-18 | End: 2021-03-18 | Stop reason: SURG

## 2021-03-18 RX ORDER — KETOROLAC TROMETHAMINE 30 MG/ML
30 INJECTION, SOLUTION INTRAMUSCULAR; INTRAVENOUS EVERY 6 HOURS PRN
Status: DISPENSED | OUTPATIENT
Start: 2021-03-18 | End: 2021-03-20

## 2021-03-18 RX ORDER — HYDROMORPHONE HYDROCHLORIDE 1 MG/ML
0.5 INJECTION, SOLUTION INTRAMUSCULAR; INTRAVENOUS; SUBCUTANEOUS
Status: DISCONTINUED | OUTPATIENT
Start: 2021-03-18 | End: 2021-03-18 | Stop reason: SDUPTHER

## 2021-03-18 RX ORDER — LIDOCAINE HYDROCHLORIDE 10 MG/ML
INJECTION, SOLUTION EPIDURAL; INFILTRATION; INTRACAUDAL; PERINEURAL AS NEEDED
Status: DISCONTINUED | OUTPATIENT
Start: 2021-03-18 | End: 2021-03-18 | Stop reason: SURG

## 2021-03-18 RX ORDER — LIDOCAINE HYDROCHLORIDE 10 MG/ML
0.5 INJECTION, SOLUTION EPIDURAL; INFILTRATION; INTRACAUDAL; PERINEURAL ONCE AS NEEDED
Status: COMPLETED | OUTPATIENT
Start: 2021-03-18 | End: 2021-03-18

## 2021-03-18 RX ORDER — FAMOTIDINE 20 MG/1
20 TABLET, FILM COATED ORAL ONCE
Status: COMPLETED | OUTPATIENT
Start: 2021-03-18 | End: 2021-03-18

## 2021-03-18 RX ORDER — ONDANSETRON 4 MG/1
4 TABLET, FILM COATED ORAL EVERY 6 HOURS PRN
Status: DISCONTINUED | OUTPATIENT
Start: 2021-03-18 | End: 2021-03-22 | Stop reason: HOSPADM

## 2021-03-18 RX ORDER — MIDAZOLAM HYDROCHLORIDE 1 MG/ML
2 INJECTION INTRAMUSCULAR; INTRAVENOUS
Status: DISCONTINUED | OUTPATIENT
Start: 2021-03-18 | End: 2021-03-18 | Stop reason: HOSPADM

## 2021-03-18 RX ORDER — GLYCOPYRROLATE 0.2 MG/ML
INJECTION INTRAMUSCULAR; INTRAVENOUS AS NEEDED
Status: DISCONTINUED | OUTPATIENT
Start: 2021-03-18 | End: 2021-03-18 | Stop reason: SURG

## 2021-03-18 RX ORDER — HYDROCODONE BITARTRATE AND ACETAMINOPHEN 7.5; 325 MG/1; MG/1
1 TABLET ORAL EVERY 6 HOURS PRN
Status: DISCONTINUED | OUTPATIENT
Start: 2021-03-18 | End: 2021-03-18 | Stop reason: SDUPTHER

## 2021-03-18 RX ORDER — SODIUM CHLORIDE 0.9 % (FLUSH) 0.9 %
10 SYRINGE (ML) INJECTION AS NEEDED
Status: DISCONTINUED | OUTPATIENT
Start: 2021-03-18 | End: 2021-03-18 | Stop reason: HOSPADM

## 2021-03-18 RX ORDER — SODIUM CHLORIDE 0.9 % (FLUSH) 0.9 %
10 SYRINGE (ML) INJECTION EVERY 12 HOURS SCHEDULED
Status: DISCONTINUED | OUTPATIENT
Start: 2021-03-18 | End: 2021-03-18 | Stop reason: HOSPADM

## 2021-03-18 RX ORDER — HYDROMORPHONE HYDROCHLORIDE 1 MG/ML
0.5 INJECTION, SOLUTION INTRAMUSCULAR; INTRAVENOUS; SUBCUTANEOUS
Status: DISCONTINUED | OUTPATIENT
Start: 2021-03-18 | End: 2021-03-18 | Stop reason: HOSPADM

## 2021-03-18 RX ORDER — ACETAMINOPHEN 325 MG/1
650 TABLET ORAL EVERY 4 HOURS PRN
Status: DISCONTINUED | OUTPATIENT
Start: 2021-03-18 | End: 2021-03-22 | Stop reason: HOSPADM

## 2021-03-18 RX ORDER — FENTANYL CITRATE 50 UG/ML
INJECTION, SOLUTION INTRAMUSCULAR; INTRAVENOUS AS NEEDED
Status: DISCONTINUED | OUTPATIENT
Start: 2021-03-18 | End: 2021-03-18 | Stop reason: SURG

## 2021-03-18 RX ORDER — SODIUM CHLORIDE, SODIUM LACTATE, POTASSIUM CHLORIDE, CALCIUM CHLORIDE 600; 310; 30; 20 MG/100ML; MG/100ML; MG/100ML; MG/100ML
9 INJECTION, SOLUTION INTRAVENOUS CONTINUOUS
Status: DISCONTINUED | OUTPATIENT
Start: 2021-03-18 | End: 2021-03-21

## 2021-03-18 RX ORDER — FAMOTIDINE 10 MG/ML
20 INJECTION, SOLUTION INTRAVENOUS ONCE
Status: DISCONTINUED | OUTPATIENT
Start: 2021-03-18 | End: 2021-03-18 | Stop reason: HOSPADM

## 2021-03-18 RX ORDER — FENTANYL CITRATE 50 UG/ML
50 INJECTION, SOLUTION INTRAMUSCULAR; INTRAVENOUS
Status: DISCONTINUED | OUTPATIENT
Start: 2021-03-18 | End: 2021-03-18 | Stop reason: SDUPTHER

## 2021-03-18 RX ORDER — MIDAZOLAM HYDROCHLORIDE 1 MG/ML
1 INJECTION INTRAMUSCULAR; INTRAVENOUS
Status: DISCONTINUED | OUTPATIENT
Start: 2021-03-18 | End: 2021-03-18 | Stop reason: HOSPADM

## 2021-03-18 RX ORDER — HYDROCODONE BITARTRATE AND ACETAMINOPHEN 5; 325 MG/1; MG/1
1 TABLET ORAL EVERY 6 HOURS PRN
Status: DISCONTINUED | OUTPATIENT
Start: 2021-03-18 | End: 2021-03-22 | Stop reason: HOSPADM

## 2021-03-18 RX ORDER — ONDANSETRON 2 MG/ML
INJECTION INTRAMUSCULAR; INTRAVENOUS AS NEEDED
Status: DISCONTINUED | OUTPATIENT
Start: 2021-03-18 | End: 2021-03-18 | Stop reason: SURG

## 2021-03-18 RX ORDER — MAGNESIUM HYDROXIDE 1200 MG/15ML
LIQUID ORAL AS NEEDED
Status: DISCONTINUED | OUTPATIENT
Start: 2021-03-18 | End: 2021-03-18 | Stop reason: HOSPADM

## 2021-03-18 RX ORDER — CHLORHEXIDINE GLUCONATE 500 MG/1
1 CLOTH TOPICAL EVERY 12 HOURS PRN
Status: DISCONTINUED | OUTPATIENT
Start: 2021-03-18 | End: 2021-03-18 | Stop reason: HOSPADM

## 2021-03-18 RX ORDER — SODIUM CHLORIDE 9 MG/ML
30 INJECTION, SOLUTION INTRAVENOUS CONTINUOUS
Status: DISCONTINUED | OUTPATIENT
Start: 2021-03-18 | End: 2021-03-21

## 2021-03-18 RX ORDER — FENTANYL CITRATE 50 UG/ML
50 INJECTION, SOLUTION INTRAMUSCULAR; INTRAVENOUS
Status: DISCONTINUED | OUTPATIENT
Start: 2021-03-18 | End: 2021-03-18 | Stop reason: HOSPADM

## 2021-03-18 RX ORDER — ROCURONIUM BROMIDE 10 MG/ML
INJECTION, SOLUTION INTRAVENOUS AS NEEDED
Status: DISCONTINUED | OUTPATIENT
Start: 2021-03-18 | End: 2021-03-18 | Stop reason: SURG

## 2021-03-18 RX ORDER — MORPHINE SULFATE 4 MG/ML
4 INJECTION, SOLUTION INTRAMUSCULAR; INTRAVENOUS EVERY 4 HOURS PRN
Status: DISCONTINUED | OUTPATIENT
Start: 2021-03-18 | End: 2021-03-22 | Stop reason: HOSPADM

## 2021-03-18 RX ORDER — NEOSTIGMINE METHYLSULFATE 1 MG/ML
INJECTION, SOLUTION INTRAVENOUS AS NEEDED
Status: DISCONTINUED | OUTPATIENT
Start: 2021-03-18 | End: 2021-03-18 | Stop reason: SURG

## 2021-03-18 RX ADMIN — GLYCOPYRROLATE 0.4 MG: 0.4 INJECTION INTRAMUSCULAR; INTRAVENOUS at 10:57

## 2021-03-18 RX ADMIN — FAMOTIDINE 20 MG: 20 TABLET, FILM COATED ORAL at 06:25

## 2021-03-18 RX ADMIN — SODIUM CHLORIDE, POTASSIUM CHLORIDE, SODIUM LACTATE AND CALCIUM CHLORIDE 9 ML/HR: 600; 310; 30; 20 INJECTION, SOLUTION INTRAVENOUS at 06:25

## 2021-03-18 RX ADMIN — FENTANYL CITRATE 50 MCG: 50 INJECTION, SOLUTION INTRAMUSCULAR; INTRAVENOUS at 11:24

## 2021-03-18 RX ADMIN — CEFUROXIME SODIUM 1.5 G: 1.5 INJECTION, POWDER, FOR SOLUTION INTRAVENOUS at 23:56

## 2021-03-18 RX ADMIN — DOCUSATE SODIUM 50MG AND SENNOSIDES 8.6MG 2 TABLET: 8.6; 5 TABLET, FILM COATED ORAL at 23:56

## 2021-03-18 RX ADMIN — DEXAMETHASONE SODIUM PHOSPHATE 8 MG: 4 INJECTION, SOLUTION INTRA-ARTICULAR; INTRALESIONAL; INTRAMUSCULAR; INTRAVENOUS; SOFT TISSUE at 07:09

## 2021-03-18 RX ADMIN — LIDOCAINE HYDROCHLORIDE 50 MG: 10 INJECTION, SOLUTION EPIDURAL; INFILTRATION; INTRACAUDAL; PERINEURAL at 07:09

## 2021-03-18 RX ADMIN — CEFUROXIME 1.5 G: 1.5 INJECTION, POWDER, FOR SOLUTION INTRAVENOUS at 07:16

## 2021-03-18 RX ADMIN — SODIUM CHLORIDE 30 ML/HR: 9 INJECTION, SOLUTION INTRAVENOUS at 13:23

## 2021-03-18 RX ADMIN — ROCURONIUM BROMIDE 10 MG: 10 INJECTION INTRAVENOUS at 10:05

## 2021-03-18 RX ADMIN — FENTANYL CITRATE 50 MCG: 50 INJECTION, SOLUTION INTRAMUSCULAR; INTRAVENOUS at 11:41

## 2021-03-18 RX ADMIN — LIDOCAINE HYDROCHLORIDE 0.5 ML: 10 INJECTION, SOLUTION EPIDURAL; INFILTRATION; INTRACAUDAL; PERINEURAL at 06:25

## 2021-03-18 RX ADMIN — HYDROMORPHONE HYDROCHLORIDE 0.5 MG: 1 INJECTION, SOLUTION INTRAMUSCULAR; INTRAVENOUS; SUBCUTANEOUS at 12:28

## 2021-03-18 RX ADMIN — ONDANSETRON 4 MG: 2 INJECTION INTRAMUSCULAR; INTRAVENOUS at 10:23

## 2021-03-18 RX ADMIN — ROCURONIUM BROMIDE 50 MG: 10 INJECTION INTRAVENOUS at 07:09

## 2021-03-18 RX ADMIN — HYDROMORPHONE HYDROCHLORIDE 0.5 MG: 1 INJECTION, SOLUTION INTRAMUSCULAR; INTRAVENOUS; SUBCUTANEOUS at 12:05

## 2021-03-18 RX ADMIN — KETOROLAC TROMETHAMINE 30 MG: 30 INJECTION, SOLUTION INTRAMUSCULAR; INTRAVENOUS at 13:23

## 2021-03-18 RX ADMIN — NEOSTIGMINE 3 MG: 1 INJECTION INTRAVENOUS at 10:57

## 2021-03-18 RX ADMIN — ROCURONIUM BROMIDE 20 MG: 10 INJECTION INTRAVENOUS at 08:44

## 2021-03-18 RX ADMIN — PROPOFOL 200 MG: 10 INJECTION, EMULSION INTRAVENOUS at 07:16

## 2021-03-18 RX ADMIN — MORPHINE SULFATE 2 MG: 4 INJECTION, SOLUTION INTRAMUSCULAR; INTRAVENOUS at 19:23

## 2021-03-18 RX ADMIN — CEFUROXIME SODIUM 1.5 G: 1.5 INJECTION, POWDER, FOR SOLUTION INTRAVENOUS at 16:19

## 2021-03-18 RX ADMIN — FENTANYL CITRATE 100 MCG: 50 INJECTION, SOLUTION INTRAMUSCULAR; INTRAVENOUS at 07:09

## 2021-03-18 RX ADMIN — ROCURONIUM BROMIDE 10 MG: 10 INJECTION INTRAVENOUS at 09:26

## 2021-03-18 RX ADMIN — MIDAZOLAM HYDROCHLORIDE 2 MG: 1 INJECTION, SOLUTION INTRAMUSCULAR; INTRAVENOUS at 06:53

## 2021-03-18 NOTE — PROGRESS NOTES
INTENSIVIST / PULMONARY FOLLOW UP NOTE     Hospital:  LOS: 0 days   Ms. Birgit Ramirez, 51 y.o. female is followed for:     Malignant neoplasm of upper lobe of right lung (CMS/HCC)          SUBJECTIVE     HPI:  Birgit Ramirez is a 51 y.o. female, former smoker, with a past medical history of Crohn's, Left Lower Lobe Pneumonia and Adenocarcinoma of the Right Lung. She was seen by Pulmonology, Dr. Kenny, in clinic for follow up after an repeat CT for pneumonia showed a cavitary lesion in June 2020. In January 2021, she had her 6 month follow up CT Scan which revealed 2.5 cm subsolid nodule in the right upper lobe which increased in size since her previous scan. On 02/04/2021 a CT guided biopsy was performed and verified Primary Adenocarcinoma of the Right Lung, subsequently PET scan and MRI of brain showed no metastatic disease, patient was referred Cardiothoracic Surgery. Today, 03/18/2021, the patient presents to [facility] with a planned Bronchoscopy, Thoracoscopy with Lobectomy, and Mediastinal Lymph Node Dissection with Cardiothoracic Surgery.     The patient's relevant past medical, surgical, family, and social history were reviewed    Allergies and medications were reviewed    ROS:  Per subjective, all other systems were reviewed and were negative        OBJECTIVE     Vital Sign Min/Max for last 24 hours:  Temp  Min: 97.1 °F (36.2 °C)  Max: 98 °F (36.7 °C)   BP  Min: 97/40  Max: 138/78   Pulse  Min: 80  Max: 104   Resp  Min: 16  Max: 20   SpO2  Min: 96 %  Max: 99 %   No data recorded     Physical Exam:  General Appearance:  Conversant, in no acute distress  Eyes:  No scleral icterus or pallor, pupils normal  Ears, Nose, Mouth, Throat:  Atraumatic, oropharynx clear  Neck:  Trachea midline, thyroid normal  Respiratory:  Clear to auscultation bilaterally, normal effort, no tenderness to palpation  Cardiovascular:  Regular rate and rhythm, no murmurs, no peripheral edema, no thrill  Gastrointestinal:  Soft,  nontender, nondistended, no hepatosplenomegaly  Skin:  Normal temperature, no rash  Psychiatric:  Alert and oriented x 3, normal judgement and insight  Neuro:  No new focal neurologic deficits observed    Telemetry:              Hemodynamics:   CVP:     PAP:     PAOP:     CO:     CI:     SVI:     SVR:       SpO2: 98 % SpO2  Min: 96 %  Max: 99 %   Device:      Flow Rate:   No data recorded     Mechanical Ventilator Settings:                                         Intake/Ouptut 24 hrs (7:00AM - 6:59 AM)  Intake & Output (last 3 days)       03/15 0701 - 03/16 0700 03/16 0701 - 03/17 0700 03/17 0701 - 03/18 0700 03/18 0701 - 03/19 0700    Urine    225    Blood    300    Chest Tube    65    Total Output    590    Net    -590                  Lines, Drains & Airways    Active LDAs     Name:   Placement date:   Placement time:   Site:   Days:    Peripheral IV 03/18/21 0624 Left;Posterior Hand   03/18/21    0624    Hand   less than 1    Urethral Catheter Straight-tip 16 Fr.   03/18/21    0720     less than 1    ETT    03/18/21    0714 created via procedure documentation     less than 1                Hematology:  Results from last 7 days   Lab Units 03/17/21  0946   WBC 10*3/mm3 5.38   HEMOGLOBIN g/dL 12.0   HEMATOCRIT % 38.2   PLATELETS 10*3/mm3 187     Electrolytes, Magnesium and Phosphorus:  Results from last 7 days   Lab Units 03/17/21  0946   SODIUM mmol/L 141   CHLORIDE mmol/L 106   POTASSIUM mmol/L 4.2   CO2 mmol/L 25.0     Renal:  Results from last 7 days   Lab Units 03/17/21  0946   CREATININE mg/dL 0.71   BUN mg/dL 7     Estimated Creatinine Clearance: 119.4 mL/min (by C-G formula based on SCr of 0.71 mg/dL).  Hepatic:  Results from last 7 days   Lab Units 03/17/21  0946   ALK PHOS U/L 89   BILIRUBIN mg/dL 0.5   BILIRUBIN DIRECT mg/dL <0.2   ALT (SGPT) U/L 13   AST (SGOT) U/L 16     Arterial Blood Gases:        Results from last 7 days   Lab Units 03/17/21  0946   HEMOGLOBIN A1C % 5.40       No results found for:  LACTATE    Relevant imaging studies and labs from 03/18/21 were reviewed and interpreted by me    Medications (drips):  lactated ringers, Last Rate: 800 mL/hr (03/18/21 1048)  sodium chloride, Last Rate: 30 mL/hr (03/18/21 1323)        cefuroxime, 1.5 g, Intravenous, Q8H  [START ON 3/19/2021] heparin (porcine), 5,000 Units, Subcutaneous, Q12H        Assessment/Plan   IMPRESSION / PLAN     Inpatient Problem List:  51 y.o.female:  Active Hospital Problems    Diagnosis    • **Malignant neoplasm of upper lobe of right lung (CMS/HCC)         Impression:  51 y.o.female former smoker, with a past medical history of Crohn's, Left Lower Lobe Pneumonia and Adenocarcinoma of the Right Lung. She was seen by Pulmonology, Dr. Kenny, in clinic for follow up after an repeat CT for pneumonia showed a cavitary lesion in June 2020. In January 2021, she had her 6 month follow up CT Scan which revealed 2.5 cm subsolid nodule in the right upper lobe which increased in size since her previous scan. On 02/04/2021 a CT guided biopsy was performed and verified Primary Adenocarcinoma of the Right Lung, subsequently PET scan and MRI of brain showed no metastatic disease, patient was referred Cardiothoracic Surgery.  Now admitted 3/18/2021 post op VATS RUL Lobectomy by Dr. Cevallos.    Plan:  Post op care per CTS    Pain - titrate narcotics, stool softeners    Resume / continue appropriate home meds    DVT / PUD prophylaxis    Nutrition - Diet Regular; Cardiac    Plan of care and goals reviewed with multidisciplinary team at daily rounds           Miko Price MD  Intensive Care Medicine  03/18/21 15:18 EDT

## 2021-03-18 NOTE — ANESTHESIA POSTPROCEDURE EVALUATION
Patient: Birgit Ramirez    Procedure Summary     Date: 03/18/21 Room / Location:  [facility]  [facility]    Anesthesia Start: 0706 Anesthesia Stop:     Procedures:       BRONCHOSCOPY (N/A Bronchus)      THORACOSCOPY VIDEO ASSISTED WITH RIGHT UPPER LOBECTOMY AND INTERCOSTAL CRYO ABLATION (Right Chest)      MEDIASTINAL LYMPH NODE DISSECTION (Right Chest) Diagnosis:       Malignant neoplasm of upper lobe of right lung (CMS/HCC)      (Malignant neoplasm of upper lobe of right lung (CMS/HCC) [C34.11])    Surgeons: Kvng Cevallos MD Provider: Moises Bolden MD    Anesthesia Type: general ASA Status: 3          Anesthesia Type: general    Vitals  No vitals data found for the desired time range.          Post Anesthesia Care and Evaluation    Patient location during evaluation: PACU  Patient participation: complete - patient participated  Level of consciousness: awake and responsive to verbal stimuli  Pain score: 2  Pain management: adequate  Airway patency: patent  Anesthetic complications: No anesthetic complications    Cardiovascular status: acceptable  Respiratory status: acceptable  Hydration status: acceptable    Comments: Pt awake and responsive. SV. VSS. Report to RN. Patient Vitals in the past 24 hrs:  03/18/21 0616, BP:125/75, Temp:98 °F (36.7 °C), Temp src:Temporal, Pulse:80, Resp:18, SpO2:99 %  133/78. p 72. r 16. t 98.1

## 2021-03-18 NOTE — H&P
Show:Clear all  [x]Manual[x]Template[]Copied    Added by:  [x]Kvng Cevallos MD[x]Joanie Moreno    []Christine for details  2021  Patient Information  Birgit Ramirez                                                                                          [address] [facility] [place] [address]   1970  'PCP/Referring Physician'  Jose Raines MD  [phone]  Jesse Kenny MD  [phone]       Chief Complaint   Patient presents with   • Consult       New patient per Dr. Jesse Kenny for right lung cancer. Pt states that she is feeling fine, has no compliants.          History of Present Illness: 51-year-old  female with a history of Crohn's disease and previous tobacco abuse who presents with a newly diagnosed right upper lobe lung cancer.  The patient was initially diagnosed with pneumonia in January after a cough with sputum production and was treated with antibiotics.  CT scan demonstrated a right lung nodule and a subsequent CT-guided needle biopsy demonstrated invasive adenocarcinoma.  The patient denies cough, hemoptysis, weight loss, lymphadenopathy, fevers or chills.        There is no problem list on file for this patient.     Medical History        Past Medical History:   Diagnosis Date   • Crohn's disease (CMS/HCC)     • Depression           Surgical History         Past Surgical History:   Procedure Laterality Date   •  SECTION         2x   • CHOLECYSTECTOMY       • COLON RESECTION       • COLONOSCOPY                Current Outpatient Medications:   •  Cyanocobalamin (VITAMIN B-12 IJ), Inject  as directed Every 30 (Thirty) Days., Disp: , Rfl:   •  sertraline (ZOLOFT) 50 MG tablet, , Disp: , Rfl:   •  albuterol sulfate  (90 Base) MCG/ACT inhaler, , Disp: , Rfl:   No Known Allergies  Social History   Social History            Socioeconomic History   • Marital status:        Spouse name: Not on file   • Number of children: 2   • Years of education: Not  on file   • Highest education level: Not on file   Tobacco Use   • Smoking status: Former Smoker       Packs/day: 0.50       Years: 18.00       Pack years: 9.00       Types: Cigarettes, Electronic Cigarette       Quit date: 2006       Years since quitting: 15.1   • Smokeless tobacco: Never Used   • Tobacco comment: quit cigarettes 2006, vaped 10 years quit jan 2020   Vaping Use   • Vaping Use: Former   • Quit date: 1/1/2020   Substance and Sexual Activity   • Alcohol use: No   • Drug use: No   • Sexual activity: Defer               Family History   Problem Relation Age of Onset   • Crohn's disease Mother     • Cancer Mother     • Diabetes Father     • Hypertension Father     • Hyperlipidemia Father     • Peripheral vascular disease Father        Review of Systems   Constitutional: Negative for chills, fever, malaise/fatigue, night sweats and weight loss.   HENT: Negative for congestion, hearing loss, nosebleeds and odynophagia.    Cardiovascular: Negative for chest pain, claudication, dyspnea on exertion, leg swelling, orthopnea, palpitations and syncope.   Respiratory: Negative for cough, hemoptysis, shortness of breath and wheezing.    Endocrine: Negative for cold intolerance, heat intolerance, polydipsia, polyphagia and polyuria.   Hematologic/Lymphatic: Does not bruise/bleed easily.   Skin: Negative for itching, poor wound healing and rash.   Musculoskeletal: Positive for joint pain (right knee ). Negative for arthritis, back pain, joint swelling and myalgias.   Gastrointestinal: Positive for diarrhea. Negative for abdominal pain, constipation, hematemesis, melena, nausea and vomiting.   Genitourinary: Negative for dysuria, frequency, hematuria, nocturia and urgency.   Neurological: Negative for dizziness, light-headedness, loss of balance and numbness.   Psychiatric/Behavioral: Positive for depression. Negative for suicidal ideas. The patient is nervous/anxious.    Allergic/Immunologic: Positive for  "environmental allergies. Negative for HIV exposure.      Vitals       Vitals:     03/09/21 1000   BP: 118/72   Pulse: 101   Temp: 97.8 °F (36.6 °C)   SpO2: 98%   Weight: 94.3 kg (208 lb)   Height: 182.9 cm (72\")         Physical Exam  Constitutional:       General: She is not in acute distress.     Appearance: She is well-developed. She is not diaphoretic.      Comments:  female who appears stated age   HENT:      Head: Normocephalic and atraumatic.   Eyes:      General: No scleral icterus.     Conjunctiva/sclera: Conjunctivae normal.   Neck:      Vascular: No JVD.      Trachea: No tracheal deviation.      Comments: No carotid bruits bilaterally  Cardiovascular:      Rate and Rhythm: Normal rate and regular rhythm.      Heart sounds: Normal heart sounds. No murmur. No friction rub. No gallop.    Pulmonary:      Effort: Pulmonary effort is normal. No respiratory distress.      Breath sounds: Normal breath sounds. No wheezing or rales.   Abdominal:      General: There is no distension.      Palpations: Abdomen is soft. There is no mass.      Tenderness: There is no abdominal tenderness. There is no guarding or rebound.   Musculoskeletal:         General: Normal range of motion.      Cervical back: Neck supple.   Lymphadenopathy:      Cervical: No cervical adenopathy.      Upper Body:      Right upper body: No supraclavicular or axillary adenopathy.      Left upper body: No supraclavicular or axillary adenopathy.   Skin:     General: Skin is warm and dry.      Findings: No erythema or rash.   Neurological:      Mental Status: She is alert and oriented to person, place, and time.   Psychiatric:         Behavior: Behavior normal.         Thought Content: Thought content normal.         Judgment: Judgment normal.            The ROS, past medical history, surgical history, family history, social history and vitals were reviewed by myself and corrected as needed.       Labs/Imaging:  -CT-guided needle biopsy of the " right upper lobe lung nodule performed 2/8/2021, invasive adenocarcinoma  -MRI brain with and without contrast performed 3/2/2021, per report, demonstrates no evidence of metastatic disease  -PET/CT performed 3/2/2021, personally reviewed, demonstrates a 19 mm right upper lobe nodule with an SUV of 2.5.  No hypermetabolic hilar or mediastinal lymphadenopathy.  -Pulmonary function test performed 1/10/2020, FEV1 2.40 (70% predicted), DLCO 71% predicted     Assessment/Plan:  51-year-old  female with a history of Crohn's disease and previous tobacco abuse who presents with a newly diagnosed 1.9 cm right upper lobe lung cancer.  Tentatively, this represents a N9dY2T1 Clinical stage IA2 malignancy.  I discussed with the patient performing bronchoscopy, right VATS, right upper lobectomy, mediastinal lymph node dissection and intercostal nerve blocks.  The risks and benefits of surgery were discussed with the patient including pain, bleeding, infection, air leak, myocardial infarction and death.  The patient understood these risks and wished to proceed with surgery.        [facility] Pre-op    Full history and physical note from office is up to date.     /75 (BP Location: Right arm, Patient Position: Lying)   Pulse 80   Temp 98 °F (36.7 °C) (Temporal)   Resp 18   SpO2 99%   Breastfeeding No   Patient denies allergy to contrast dye or latex  IMM:  Influenza: No  Pneumococcal: No  Tetanus: Yes  Lungs: Clear to auscultation bilaterally to the bases, increased AP diameter noted.  Cardiovascular: S1-S2 without rubs murmurs or gallops, PMI is not displaced.  Abdomen: Soft, nontender, bowel sounds present throughout.  LAB Results:  Lab Results   Component Value Date    WBC 5.38 03/17/2021    HGB 12.0 03/17/2021    HCT 38.2 03/17/2021    MCV 93.4 03/17/2021     03/17/2021    NEUTROABS 3.39 03/17/2021    GLUCOSE 90 03/17/2021    BUN 7 03/17/2021    CREATININE 0.71 03/17/2021    EGFRIFNONA 86  03/17/2021     03/17/2021    K 4.2 03/17/2021     03/17/2021    CO2 25.0 03/17/2021    CALCIUM 8.2 (L) 03/17/2021    ALBUMIN 4.00 03/17/2021    AST 16 03/17/2021    ALT 13 03/17/2021    BILITOT 0.5 03/17/2021    PTT 31.8 02/08/2021    INR 1.00 03/17/2021   Impression: Right upper lobe invasive adenocarcinoma  Plan: Bronchoscopy, thoracoscopy with right upper lobectomy, mediastinal lymph node dissection.    Cancer Staging (if applicable)  Cancer Patient: __ yes __no __unknown__N/A; If yes, clinical stage T:__ N:__M:__, stage group or __N/A    SUZANNE Alford 3/18/2021 06:45 EDT

## 2021-03-18 NOTE — ANESTHESIA PREPROCEDURE EVALUATION
Anesthesia Evaluation     Patient summary reviewed and Nursing notes reviewed                Airway   Mallampati: I  TM distance: >3 FB  Neck ROM: full  No difficulty expected  Dental - normal exam     Pulmonary - normal exam   (+) pneumonia , a smoker Former, lung cancer,   Cardiovascular - negative cardio ROS and normal exam        Neuro/Psych  (+) psychiatric history Depression,     GI/Hepatic/Renal/Endo - negative ROS     Musculoskeletal (-) negative ROS    Abdominal  - normal exam    Bowel sounds: normal.   Substance History - negative use     OB/GYN negative ob/gyn ROS         Other      history of cancer    Blood dyscrasia: LUNG.                  Anesthesia Plan    ASA 3     general     intravenous induction     Anesthetic plan, all risks, benefits, and alternatives have been provided, discussed and informed consent has been obtained with: patient.    Plan discussed with CRNA.

## 2021-03-18 NOTE — ANESTHESIA PROCEDURE NOTES
Airway  Urgency: elective    Date/Time: 3/18/2021 7:14 AM  Airway not difficult    General Information and Staff    Patient location during procedure: OR  Anesthesiologist: Moises Bolden MD  CRNA: Fernando Tanner CRNA    Indications and Patient Condition  Indications for airway management: airway protection    Preoxygenated: yes  MILS not maintained throughout  Mask difficulty assessment: 1 - vent by mask    Final Airway Details  Final airway type: endotracheal airway      Successful airway: ETT and EBT - double lumen left  Cuffed: yes   Successful intubation technique: direct laryngoscopy  Facilitating devices/methods: intubating stylet  Endotracheal tube insertion site: oral  Blade: Nico  Blade size: 3  ETT size (mm): 7.0  EBT DL size (fr): 37  Cormack-Lehane Classification: grade I - full view of glottis  Placement verified by: chest auscultation and capnometry   Measured from: lips  ETT/EBT  to lips (cm): 20  Number of attempts at approach: 1  Assessment: lips, teeth, and gum same as pre-op and atraumatic intubation    Additional Comments  Negative epigastric sounds, Breath sound equal bilaterally with symmetric chest rise and fall. Placement confirmed per Dr. Bolden via bronchoscope

## 2021-03-18 NOTE — OP NOTE
DATE OF PROCEDURE: 3/18/2021     PREOPERATIVE DIAGNOSES:  1. Right upper lobe adenocarcinoma ([id number] Clinical stage IA2)  2. Previous tobacco abuse     POSTOPERATIVE DIAGNOSES:    1. Right upper lobe adenocarcinoma ([id number] Clinical stage IA2)  2. Previous tobacco abuse     PROCEDURES PERFORMED:    1. Bronchoscopy  2. Right video assisted thoracoscopic surgery  3. Right upper lobectomy  4. Mediastinal lymph node dissection  5. Intercostal nerve blocks with cryoablation and local    SURGEON: Kvng Cevallos MD       ASSISTANTS:    1. Susan Fontaine PA-C was responsible for performing the following activities: Retraction, Closing, Placing Dressing and Held/Positioned Camera and their skilled assistance was necessary for the success of this case.    Circulator: Gisselle Burgos RN; Grey Bryant RN  Scrub Person: Teresa Segovia  Nursing Assistant: Malgorzata Castaneda      ANESTHESIA: General endotracheal anesthesia with Fernando Tanner CRNA     ESTIMATED BLOOD LOSS: 400 mL.       INDICATIONS:  51-year-old  female with a history of Crohn's disease and previous tobacco abuse who initially presented with pneumonia and subsequent workup revealed a 1.9 cm right upper lobe adenocarcinoma.  This was thought to represent a F7qV9A0 Clinical stage IA2 malignancy.  The patient was felt to be a reasonable candidate for bronchoscopy, right VATS, right upper lobectomy, mediastinal lymph node dissection and intercostal nerve blocks. The risks and benefits of surgery were discussed with the patient including pain, bleeding, infection, air leak, myocardial infarction and death. The patient understood these risks and wished to proceed with surgery.      DESCRIPTION OF PROCEDURE:  The patient was taken to the operating room and placed under general endotracheal anesthesia.  A double-lumen endotracheal tube was placed and position verified with the pediatric bronchoscope.  Examination of the bilateral bronchial tree down to the  level of the subsegmental bronchi did not reveal any evidence of endobronchial mass or blood.  There were scattered tan secretions.  The patient was placed in the left lateral decubitus position and all 4 extremities were padded and secured to prevent neurologic injury.  She was prepped and draped in the usual sterile fashion and a timeout was performed including the patient's name, procedure and antibiotic administration.  An incision was made at the seventh intercostal space in the midaxillary line.  Additional incisions were made posteriorly at the seventh intercostal space and an anterior incision at the level of the superior pulmonary vein.  There were adhesions apically separate from the known tumor in the azygous lobe that were taken down with electrocautery.  Additional adhesions were present on the lower lobe to the diaphragm that were taken down sharply and with electrocautery.  The inferior pulmonary ligament was divided up to the inferior pulmonary vein.  A lymph node was identified within the ligament and sent for permanent pathology.  The superior pulmonary vein branch to the upper lobe was encircled and divided using a vascular stapler.  The arterial branches to the upper lobe were identified, encircled and divided using a vascular staple load.  The remaining upper lobe bronchus was encircled and a test clamp applied with a 45 black staple load.  Test insufflation revealed satisfactory ventilation of the middle and lower lobes.  The bronchus was divided.  The remaining fissure was divided with additional purple staple loads.  The lobe was then externalized using a 15 mm Endo Catch bag and sent for margins.  Lymph nodes were harvested from stations 2R, 4R, 7 and 9.  The patient had a significant amount of lymph nodes that were harvested.  All involved interspaces were anesthetized using cryoablation intercostal nerve blocks within the chest under direct vision.  Intercostal nerves from T3 to T7 were  ablated down to negative sixty degrees for two minutes each using the Cryosphere.  All interspaces were then anesthetized with local within the chest under direct vision.  The chest was then irrigated with warm water and test insufflation did not reveal any evidence of air leak.  Two 28 Japanese channel drains were placed anterior and posterior to the hilum.  These were secured using 0 silk suture.  A 0 Ethibond suture was placed in a mattress fashion for later thoracostomy site closure.  The lung was then inflated under direct vision and found to satisfactorily occupy the chest.  The anterior incision was closed with a running 2-0 Vicryl suture followed by 3-0 Vicryl dermal layer and 4-0 Monocryl subcuticular stitch.  Overlying skin glue was applied to this incision and gauze and tape to the chest tube sites.  The patient was returned to the supine position, extubated in the operating room and transported to recovery in stable condition.

## 2021-03-18 NOTE — BRIEF OP NOTE
BRONCHOSCOPY, THORACOSCOPY VIDEO ASSISTED WITH LOBECTOMY, MEDIASTINAL LYMPH NODE DISSECTION  Progress Note    Birgit Ramirez  3/18/2021    Pre-op Diagnosis:   Malignant neoplasm of upper lobe of right lung (CMS/HCC) [C34.11]       Post-Op Diagnosis Codes:     * Malignant neoplasm of upper lobe of right lung (CMS/HCC) [C34.11]    Procedure/CPT® Codes:    Procedure(s):  BRONCHOSCOPY  THORACOSCOPY VIDEO ASSISTED WITH RIGHT UPPER LOBECTOMY AND INTERCOSTAL CRYO ABLATION  MEDIASTINAL LYMPH NODE DISSECTION    Surgeon(s):  Kvng Cevallos MD    Anesthesia: General    Staff:   Circulator: Gisselle Brugos RN; Grey Bryant RN  Scrub Person: Teresa Segovia  Nursing Assistant: Malgorzata Castaneda  Assistant: Juan David Nolasco PA-C; Charlotte Mcqueen PA-C; Susan Fontaine PA-C  Assistant: Juan David Nolasco PA-C; Charlotte Mcqueen PA-C; Susan Fontaine PA-C    Estimated Blood Loss: 400 mL    Urine Voided: * No values recorded between 3/18/2021  7:05 AM and 3/18/2021 10:42 AM *    Specimens:                Specimens     ID Source Type Tests Collected By Collected At Frozen?    A Lymph Node Tissue · TISSUE PATHOLOGY EXAM   Kvng Cevallos MD 3/18/21 0814     Description: STATION 11 MEDIASTINAL LYMPH NODE    This specimen was not marked as sent.    B Lung, Right Upper Lobe Tissue · TISSUE PATHOLOGY EXAM   Kvng Cevallos MD 3/18/21 0918 Yes    Description: RIGHT UPPER LOBE FOR MARGINS    C Lymph Node Tissue · TISSUE PATHOLOGY EXAM   Kvng Cevallos MD 3/18/21 0935 No    Description: LEVEL 9 MEDIASTINAL LYMPH NODE    This specimen was not marked as sent.    D Lymph Node Tissue · TISSUE PATHOLOGY EXAM   Kvng Cevallos MD 3/18/21 0940     Description: STATION 7 MEDIASTINAL LYMPH NODE    This specimen was not marked as sent.    E Lymph Node Tissue · TISSUE PATHOLOGY EXAM   Kvng Cevallos MD 3/18/21 0957 No    Description: MEDIASTINAL LYMPH NODE 4R    This specimen was not marked as sent.    F Lymph Node Tissue  · TISSUE PATHOLOGY EXAM   Kvng Cevallos MD 3/18/21 1010 No    Description: MEDIASTINAL LYMPH NODE 2R    This specimen was not marked as sent.                Drains:   Urethral Catheter Straight-tip 16 Fr. (Active)       Y Chest Tube 1 and 2 1 Right 2 Right (Active)   Function -20 cm H2O 03/18/21 1022   Left Subcutaneous Emphysema none present 03/18/21 1022   Right Subcutaneous Emphysema none present 03/18/21 1022   Safety all connections secured 03/18/21 1022   Air Leak/Fluctuation 1 air leak not present 03/18/21 1022   Dressing Type 1 Gauze 03/18/21 1022   Dressing Intervention 1 New dressing 03/18/21 1022   Securement 1 tubing anchored to body distal to insertion site with tape;tubing anchored to body distal to insertion site with sutures 03/18/21 1022   Air Leak/Fluctuation 2 air leak not present 03/18/21 1022   Dressing Type 2 Gauze 03/18/21 1022   Dressing Status 2 Clean;Dry;Intact 03/18/21 1022   Dressing Intervention 2 New dressing 03/18/21 1022   Securement 2 tubing anchored to body distal to insertion site with tape;tubing anchored to body distal to insertion site with sutures 03/18/21 1022       Findings: Margins clear    Complications: None    Assistant: Susan Fontaine PA-C was responsible for performing the following activities: Retraction, Closing, Placing Dressing and Held/Positioned Camera and their skilled assistance was necessary for the success of this case.    Kvng Cevallos MD     Date: 3/18/2021  Time: 10:42 EDT

## 2021-03-18 NOTE — PROGRESS NOTES
INTENSIVIST / PULMONARY FOLLOW UP NOTE     Hospital:  LOS: 0 days   Ms. Birgit Ramirez, 51 y.o. female is followed for:     Malignant neoplasm of upper lobe of right lung (CMS/HCC)          SUBJECTIVE     HPI:  Birgit Ramirez is a 51 y.o. female, former smoker, with a past medical history of Crohn's, Left Lower Lobe Pneumonia and Adenocarcinoma of the Right Lung. She was being seen by Pulmonology in clinic for a follow up after an repeat CT for her pneumonia scan showed a cavitary lesion in June 2020. In January 2021, she had her 6 month follow CT Scan which revealed 2.5 cm subsolid nodule in the right upper lobe which has increased in size since her previous scan. On 02/04/2021 a CT guided biopsy was performed and verified Primary Adenocarcinoma of the Right Lung, subsequently PET scan and MRI of brain showed no metastatic disease, patient was referred Cardiothoracic Surgery. Patient denies cough, fever, hemoptysis, weight loss, lymphadenopathy, fever, and chills.  Today, 03/18/2021, the patient presents to [facility] with a planned Bronchoscopy, Thoracoscopy with Lobectomy, and Mediastinal Lymph Node Dissection with Cardiothoracic Surgery.       Interval History:      The patient's relevant past medical, surgical, family, and social history were reviewed    Allergies and medications were reviewed    ROS:  Per subjective, all other systems were reviewed and were negative        OBJECTIVE     Vital Sign Min/Max for last 24 hours:  Temp  Min: 98 °F (36.7 °C)  Max: 98 °F (36.7 °C)   BP  Min: 125/75  Max: 125/75   Pulse  Min: 80  Max: 80   Resp  Min: 18  Max: 18   SpO2  Min: 99 %  Max: 99 %   No data recorded     Physical Exam:  General Appearance:  Conversant, in no acute distress  Eyes:  No scleral icterus or pallor, pupils normal  Ears, Nose, Mouth, Throat:  Atraumatic, oropharynx clear  Neck:  Trachea midline, thyroid normal  Respiratory:  Clear to auscultation bilaterally, normal effort, no tenderness to  palpation  Cardiovascular:  Regular rate and rhythm, no murmurs, no peripheral edema, no thrill  Gastrointestinal:  Soft, nontender, nondistended, no hepatosplenomegaly  Skin:  Normal temperature, no rash  Psychiatric:  Alert and oriented x 3, normal judgement and insight  Neuro:  No new focal neurologic deficits observed    Telemetry:              Hemodynamics:   CVP:     PAP:     PAOP:     CO:     CI:     SVI:     SVR:       SpO2: 99 % SpO2  Min: 99 %  Max: 99 %   Device:      Flow Rate:   No data recorded     Mechanical Ventilator Settings:                                         Intake/Ouptut 24 hrs (7:00AM - 6:59 AM)  Intake & Output (last 3 days)     None          Lines, Drains & Airways    Active LDAs     Name:   Placement date:   Placement time:   Site:   Days:    Peripheral IV 03/18/21 0624 Left;Posterior Hand   03/18/21    0624    Hand   less than 1    Urethral Catheter Straight-tip 16 Fr.   03/18/21    0720     less than 1    ETT    03/18/21    0714 created via procedure documentation     less than 1                Hematology:  Results from last 7 days   Lab Units 03/17/21  0946   WBC 10*3/mm3 5.38   HEMOGLOBIN g/dL 12.0   HEMATOCRIT % 38.2   PLATELETS 10*3/mm3 187     Electrolytes, Magnesium and Phosphorus:  Results from last 7 days   Lab Units 03/17/21  0946   SODIUM mmol/L 141   CHLORIDE mmol/L 106   POTASSIUM mmol/L 4.2   CO2 mmol/L 25.0     Renal:  Results from last 7 days   Lab Units 03/17/21  0946   CREATININE mg/dL 0.71   BUN mg/dL 7     Estimated Creatinine Clearance: 119.4 mL/min (by C-G formula based on SCr of 0.71 mg/dL).  Hepatic:  Results from last 7 days   Lab Units 03/17/21  0946   ALK PHOS U/L 89   BILIRUBIN mg/dL 0.5   BILIRUBIN DIRECT mg/dL <0.2   ALT (SGPT) U/L 13   AST (SGOT) U/L 16     Arterial Blood Gases:        Results from last 7 days   Lab Units 03/17/21  0946   HEMOGLOBIN A1C % 5.40       No results found for: LACTATE    Relevant imaging studies and labs from 03/18/21 were  reviewed and interpreted by me    Medications (drips):  lactated ringers, Last Rate: 9 mL/hr (03/18/21 0625)        famotidine, 20 mg, Intravenous, Once  mupirocin, 1 application, Each Nare, Once  sodium chloride, 10 mL, Intravenous, Q12H        Assessment/Plan   IMPRESSION / PLAN     Inpatient Problem List:  51 y.o.female:  Active Hospital Problems    Diagnosis    • **Malignant neoplasm of upper lobe of right lung (CMS/HCC)         Impression:  51 y.o.female with relevant PMH of *** admitted 3/18/2021     Plan:      Nutrition - No diet orders on file    Plan of care and goals reviewed with multidisciplinary team at daily rounds    Critical Care time spent in direct patient care: *** minutes (excluding procedure time, if applicable) including high complexity decision making to assess, manipulate, and support vital organ system failure in this individual who has impairment of one or more vital organ systems such that there is a high probability of imminent or life threatening deterioration in the patient’s condition.       Miko Price MD  Intensive Care Medicine  03/18/21 08:02 EDT

## 2021-03-19 ENCOUNTER — APPOINTMENT (OUTPATIENT)
Dept: GENERAL RADIOLOGY | Facility: HOSPITAL | Age: 51
End: 2021-03-19

## 2021-03-19 LAB
ALBUMIN SERPL-MCNC: 3.2 G/DL (ref 3.5–5.2)
ALBUMIN/GLOB SERPL: 1.5 G/DL
ALP SERPL-CCNC: 62 U/L (ref 39–117)
ALT SERPL W P-5'-P-CCNC: 11 U/L (ref 1–33)
ANION GAP SERPL CALCULATED.3IONS-SCNC: 8 MMOL/L (ref 5–15)
AST SERPL-CCNC: 19 U/L (ref 1–32)
BASOPHILS # BLD AUTO: 0.01 10*3/MM3 (ref 0–0.2)
BASOPHILS NFR BLD AUTO: 0.1 % (ref 0–1.5)
BILIRUB SERPL-MCNC: 0.6 MG/DL (ref 0–1.2)
BUN SERPL-MCNC: 13 MG/DL (ref 6–20)
BUN/CREAT SERPL: 16.3 (ref 7–25)
CALCIUM SPEC-SCNC: 8.2 MG/DL (ref 8.6–10.5)
CHLORIDE SERPL-SCNC: 103 MMOL/L (ref 98–107)
CO2 SERPL-SCNC: 27 MMOL/L (ref 22–29)
CREAT SERPL-MCNC: 0.8 MG/DL (ref 0.57–1)
DEPRECATED RDW RBC AUTO: 43.7 FL (ref 37–54)
DEPRECATED RDW RBC AUTO: 44 FL (ref 37–54)
EOSINOPHIL # BLD AUTO: 0 10*3/MM3 (ref 0–0.4)
EOSINOPHIL NFR BLD AUTO: 0 % (ref 0.3–6.2)
ERYTHROCYTE [DISTWIDTH] IN BLOOD BY AUTOMATED COUNT: 12.9 % (ref 12.3–15.4)
ERYTHROCYTE [DISTWIDTH] IN BLOOD BY AUTOMATED COUNT: 13 % (ref 12.3–15.4)
GFR SERPL CREATININE-BSD FRML MDRD: 75 ML/MIN/1.73
GLOBULIN UR ELPH-MCNC: 2.1 GM/DL
GLUCOSE BLDC GLUCOMTR-MCNC: 114 MG/DL (ref 70–130)
GLUCOSE SERPL-MCNC: 112 MG/DL (ref 65–99)
HCT VFR BLD AUTO: 32.7 % (ref 34–46.6)
HCT VFR BLD AUTO: 34.2 % (ref 34–46.6)
HGB BLD-MCNC: 10.3 G/DL (ref 12–15.9)
HGB BLD-MCNC: 10.7 G/DL (ref 12–15.9)
IMM GRANULOCYTES # BLD AUTO: 0.03 10*3/MM3 (ref 0–0.05)
IMM GRANULOCYTES NFR BLD AUTO: 0.3 % (ref 0–0.5)
LYMPHOCYTES # BLD AUTO: 1.03 10*3/MM3 (ref 0.7–3.1)
LYMPHOCYTES NFR BLD AUTO: 10.8 % (ref 19.6–45.3)
MAGNESIUM SERPL-MCNC: 1.9 MG/DL (ref 1.6–2.6)
MCH RBC QN AUTO: 29 PG (ref 26.6–33)
MCH RBC QN AUTO: 29 PG (ref 26.6–33)
MCHC RBC AUTO-ENTMCNC: 31.3 G/DL (ref 31.5–35.7)
MCHC RBC AUTO-ENTMCNC: 31.5 G/DL (ref 31.5–35.7)
MCV RBC AUTO: 92.1 FL (ref 79–97)
MCV RBC AUTO: 92.7 FL (ref 79–97)
MONOCYTES # BLD AUTO: 1.02 10*3/MM3 (ref 0.1–0.9)
MONOCYTES NFR BLD AUTO: 10.7 % (ref 5–12)
NEUTROPHILS NFR BLD AUTO: 7.46 10*3/MM3 (ref 1.7–7)
NEUTROPHILS NFR BLD AUTO: 78.1 % (ref 42.7–76)
NRBC BLD AUTO-RTO: 0 /100 WBC (ref 0–0.2)
PHOSPHATE SERPL-MCNC: 4.6 MG/DL (ref 2.5–4.5)
PLATELET # BLD AUTO: 176 10*3/MM3 (ref 140–450)
PLATELET # BLD AUTO: 192 10*3/MM3 (ref 140–450)
PMV BLD AUTO: 10.9 FL (ref 6–12)
PMV BLD AUTO: 11.1 FL (ref 6–12)
POTASSIUM SERPL-SCNC: 5.1 MMOL/L (ref 3.5–5.2)
PROT SERPL-MCNC: 5.3 G/DL (ref 6–8.5)
QT INTERVAL: 364 MS
QTC INTERVAL: 474 MS
RBC # BLD AUTO: 3.55 10*6/MM3 (ref 3.77–5.28)
RBC # BLD AUTO: 3.69 10*6/MM3 (ref 3.77–5.28)
SODIUM SERPL-SCNC: 138 MMOL/L (ref 136–145)
TROPONIN T SERPL-MCNC: <0.01 NG/ML (ref 0–0.03)
WBC # BLD AUTO: 10.69 10*3/MM3 (ref 3.4–10.8)
WBC # BLD AUTO: 9.55 10*3/MM3 (ref 3.4–10.8)

## 2021-03-19 PROCEDURE — 84100 ASSAY OF PHOSPHORUS: CPT | Performed by: INTERNAL MEDICINE

## 2021-03-19 PROCEDURE — 85027 COMPLETE CBC AUTOMATED: CPT | Performed by: INTERNAL MEDICINE

## 2021-03-19 PROCEDURE — 25010000002 MORPHINE PER 10 MG: Performed by: THORACIC SURGERY (CARDIOTHORACIC VASCULAR SURGERY)

## 2021-03-19 PROCEDURE — 25010000002 KETOROLAC TROMETHAMINE PER 15 MG: Performed by: THORACIC SURGERY (CARDIOTHORACIC VASCULAR SURGERY)

## 2021-03-19 PROCEDURE — 83735 ASSAY OF MAGNESIUM: CPT | Performed by: INTERNAL MEDICINE

## 2021-03-19 PROCEDURE — 93005 ELECTROCARDIOGRAM TRACING: CPT | Performed by: INTERNAL MEDICINE

## 2021-03-19 PROCEDURE — 80053 COMPREHEN METABOLIC PANEL: CPT | Performed by: INTERNAL MEDICINE

## 2021-03-19 PROCEDURE — 85025 COMPLETE CBC W/AUTO DIFF WBC: CPT | Performed by: PHYSICIAN ASSISTANT

## 2021-03-19 PROCEDURE — 71045 X-RAY EXAM CHEST 1 VIEW: CPT

## 2021-03-19 PROCEDURE — 93010 ELECTROCARDIOGRAM REPORT: CPT | Performed by: INTERNAL MEDICINE

## 2021-03-19 PROCEDURE — 82962 GLUCOSE BLOOD TEST: CPT

## 2021-03-19 PROCEDURE — 84484 ASSAY OF TROPONIN QUANT: CPT | Performed by: INTERNAL MEDICINE

## 2021-03-19 PROCEDURE — 99232 SBSQ HOSP IP/OBS MODERATE 35: CPT | Performed by: INTERNAL MEDICINE

## 2021-03-19 PROCEDURE — 25010000002 HEPARIN (PORCINE) PER 1000 UNITS: Performed by: PHYSICIAN ASSISTANT

## 2021-03-19 RX ADMIN — MORPHINE SULFATE 4 MG: 4 INJECTION, SOLUTION INTRAMUSCULAR; INTRAVENOUS at 10:52

## 2021-03-19 RX ADMIN — KETOROLAC TROMETHAMINE 30 MG: 30 INJECTION, SOLUTION INTRAMUSCULAR; INTRAVENOUS at 22:12

## 2021-03-19 RX ADMIN — HYDROCODONE BITARTRATE AND ACETAMINOPHEN 1 TABLET: 5; 325 TABLET ORAL at 15:52

## 2021-03-19 RX ADMIN — MORPHINE SULFATE 2 MG: 4 INJECTION, SOLUTION INTRAMUSCULAR; INTRAVENOUS at 03:50

## 2021-03-19 RX ADMIN — KETOROLAC TROMETHAMINE 30 MG: 30 INJECTION, SOLUTION INTRAMUSCULAR; INTRAVENOUS at 06:04

## 2021-03-19 RX ADMIN — HYDROCODONE BITARTRATE AND ACETAMINOPHEN 1 TABLET: 5; 325 TABLET ORAL at 03:50

## 2021-03-19 RX ADMIN — KETOROLAC TROMETHAMINE 30 MG: 30 INJECTION, SOLUTION INTRAMUSCULAR; INTRAVENOUS at 12:44

## 2021-03-19 RX ADMIN — MORPHINE SULFATE 4 MG: 4 INJECTION, SOLUTION INTRAMUSCULAR; INTRAVENOUS at 00:05

## 2021-03-19 RX ADMIN — MORPHINE SULFATE 4 MG: 4 INJECTION, SOLUTION INTRAMUSCULAR; INTRAVENOUS at 20:00

## 2021-03-19 RX ADMIN — DOCUSATE SODIUM 50MG AND SENNOSIDES 8.6MG 2 TABLET: 8.6; 5 TABLET, FILM COATED ORAL at 20:01

## 2021-03-19 RX ADMIN — HEPARIN SODIUM 5000 UNITS: 5000 INJECTION, SOLUTION INTRAVENOUS; SUBCUTANEOUS at 20:00

## 2021-03-19 RX ADMIN — HEPARIN SODIUM 5000 UNITS: 5000 INJECTION, SOLUTION INTRAVENOUS; SUBCUTANEOUS at 09:14

## 2021-03-19 NOTE — PROGRESS NOTES
INTENSIVIST / PULMONARY FOLLOW UP NOTE     Hospital:  LOS: 1 day   Ms. Birgit Ramirez, 51 y.o. female is followed for:     Malignant neoplasm of upper lobe of right lung (CMS/HCC)          SUBJECTIVE     No overnight events    The patient's relevant past medical, surgical, family, and social history were reviewed    Allergies and medications were reviewed    ROS:  Per subjective, all other systems were reviewed and were negative        OBJECTIVE     Vital Sign Min/Max for last 24 hours:  Temp  Min: 97.1 °F (36.2 °C)  Max: 99.2 °F (37.3 °C)   BP  Min: 97/40  Max: 138/78   Pulse  Min: 88  Max: 106   Resp  Min: 16  Max: 20   SpO2  Min: 90 %  Max: 99 %   No data recorded     Physical Exam:  General Appearance:  Conversant, in no acute distress  Eyes:  No scleral icterus or pallor, pupils normal  Ears, Nose, Mouth, Throat:  Atraumatic, oropharynx clear  Neck:  Trachea midline, thyroid normal  Respiratory:  Clear to auscultation bilaterally, normal effort, no tenderness to palpation  Cardiovascular:  Regular rate and rhythm, no murmurs, no peripheral edema, no thrill  Gastrointestinal:  Soft, nontender, nondistended, no hepatosplenomegaly  Skin:  Normal temperature, no rash  Psychiatric:  Alert and oriented x 3, normal judgement and insight  Neuro:  No new focal neurologic deficits observed    Telemetry:              Hemodynamics:   CVP:     PAP:     PAOP:     CO:     CI:     SVI:     SVR:       SpO2: 97 % SpO2  Min: 90 %  Max: 99 %   Device:      Flow Rate:   No data recorded     Mechanical Ventilator Settings:                                         Intake/Ouptut 24 hrs (7:00AM - 6:59 AM)  Intake & Output (last 3 days)       03/16 0701 - 03/17 0700 03/17 0701 - 03/18 0700 03/18 0701 - 03/19 0700 03/19 0701 - 03/20 0700    P.O.   600 480    I.V. (mL/kg)   368 (3.9)     IV Piggyback   100     Total Intake(mL/kg)   1068 (11.3) 480 (5.1)    Urine (mL/kg/hr)   825 (0.4)     Blood   300     Chest Tube   435     Total  Output   1560     Net   -492 +480                  Lines, Drains & Airways    Active LDAs     Name:   Placement date:   Placement time:   Site:   Days:    Peripheral IV 03/18/21 0624 Left;Posterior Hand   03/18/21    0624    Hand   less than 1    Urethral Catheter Straight-tip 16 Fr.   03/18/21    0720     less than 1    ETT    03/18/21    0714 created via procedure documentation     less than 1                Hematology:  Results from last 7 days   Lab Units 03/19/21  0541 03/17/21  0946   WBC 10*3/mm3 9.55 5.38   HEMOGLOBIN g/dL 10.3* 12.0   HEMATOCRIT % 32.7* 38.2   PLATELETS 10*3/mm3 176 187     Electrolytes, Magnesium and Phosphorus:  Results from last 7 days   Lab Units 03/19/21  0541 03/17/21  0946   SODIUM mmol/L 138 141   CHLORIDE mmol/L 103 106   POTASSIUM mmol/L 5.1 4.2   CO2 mmol/L 27.0 25.0   MAGNESIUM mg/dL 1.9  --    PHOSPHORUS mg/dL 4.6*  --      Renal:  Results from last 7 days   Lab Units 03/19/21  0541 03/17/21  0946   CREATININE mg/dL 0.80 0.71   BUN mg/dL 13 7     Estimated Creatinine Clearance: 106 mL/min (by C-G formula based on SCr of 0.8 mg/dL).  Hepatic:  Results from last 7 days   Lab Units 03/19/21  0541 03/17/21  0946   ALK PHOS U/L 62 89   BILIRUBIN mg/dL 0.6 0.5   BILIRUBIN DIRECT mg/dL  --  <0.2   ALT (SGPT) U/L 11 13   AST (SGOT) U/L 19 16     Arterial Blood Gases:        Results from last 7 days   Lab Units 03/17/21  0946   HEMOGLOBIN A1C % 5.40       No results found for: LACTATE    Relevant imaging studies and labs from 03/19/21 were reviewed and interpreted by me    Medications (drips):  lactated ringers, Last Rate: 800 mL/hr (03/18/21 1048)  sodium chloride, Last Rate: 30 mL/hr (03/18/21 1323)        heparin (porcine), 5,000 Units, Subcutaneous, Q12H  sennosides-docusate, 2 tablet, Oral, BID        Assessment/Plan   IMPRESSION / PLAN     Inpatient Problem List:  51 y.o.female:  Active Hospital Problems    Diagnosis    • **Malignant neoplasm of upper lobe of right lung (CMS/HCC)          Impression:  51 y.o.female former smoker, with a past medical history of Crohn's, Left Lower Lobe Pneumonia and Adenocarcinoma of the Right Lung. She was seen by Pulmonology, Dr. Kenny, in clinic for follow up after an repeat CT for pneumonia showed a cavitary lesion in June 2020. In January 2021, she had her 6 month follow up CT Scan which revealed 2.5 cm subsolid nodule in the right upper lobe which increased in size since her previous scan. On 02/04/2021 a CT guided biopsy was performed and verified Primary Adenocarcinoma of the Right Lung, subsequently PET scan and MRI of brain showed no metastatic disease, patient was referred Cardiothoracic Surgery.  Now admitted 3/18/2021 post op VATS RUL Lobectomy by Dr. Cevallos.    Plan:  Post op care per CTS    Pain - titrate narcotics, stool softeners    Resume / continue appropriate home meds    DVT / PUD prophylaxis    Nutrition - Diet Regular; Cardiac    Plan of care and goals reviewed with multidisciplinary team at daily rounds    Tele       Miko Price MD  Intensive Care Medicine  03/19/21 10:42 EDT

## 2021-03-19 NOTE — PLAN OF CARE
Goal Outcome Evaluation:  Plan of Care Reviewed With: patient  Progress: no change  Outcome Summary: 2L, iv pain meds given, resting well, scared about getting oob and pain

## 2021-03-19 NOTE — PROGRESS NOTES
Cardiothoracic Surgery Progress Note      POD # 1 s/p Right VATS upper lobectomy     LOS: 1 day      Subjective:  No complaints today.      Objective:  Vital Signs  Temp:  [97.1 °F (36.2 °C)-99.2 °F (37.3 °C)] 97.5 °F (36.4 °C)  Heart Rate:  [] 62  Resp:  [16-20] 16  BP: ()/(40-80) 118/59    Physical Exam:   General Appearance: alert, appears stated age and cooperative   Lungs: clear to auscultation, respirations regular, respirations even and respirations unlabored   Heart: regular rhythm & normal rate, normal S1, S2 and no murmur, no gallop, no rub   Skin: Incision c/d/i   Small air leak with cough    Results:  Results from last 7 days   Lab Units 03/19/21  0541   WBC 10*3/mm3 9.55   HEMOGLOBIN g/dL 10.3*   HEMATOCRIT % 32.7*   PLATELETS 10*3/mm3 176     Results from last 7 days   Lab Units 03/19/21  0541   SODIUM mmol/L 138   POTASSIUM mmol/L 5.1   CHLORIDE mmol/L 103   CO2 mmol/L 27.0   BUN mg/dL 13   CREATININE mg/dL 0.80   GLUCOSE mg/dL 112*   CALCIUM mg/dL 8.2*       Assessment:  POD # 1 s/p Right VATS upper lobectomy    Plan:  Transfer to [facility] from PCU  D/C IVF  D/C dina  Ambulate  Pulmonary toilet  Continue chest tube to suction  Await final pathology    Kvng Cevallos MD  03/19/21  07:38 EDT

## 2021-03-19 NOTE — PROGRESS NOTES
Discharge Planning Assessment  [facility]     Patient Name: Birgit Ramirez  MRN: [id number]  Today's Date: 3/19/2021    Admit Date: 3/18/2021    Discharge Needs Assessment     Row Name 03/19/21 0801       Living Environment    Lives With  spouse;child(alvina), adult;child(alvina), dependent    Current Living Arrangements  home/apartment/condo    Primary Care Provided by  self    Provides Primary Care For  no one    Family Caregiver if Needed  spouse    Able to Return to Prior Arrangements  yes       Transition Planning    Patient/Family Anticipates Transition to  home with family    Patient/Family Anticipated Services at Transition  none    Transportation Anticipated  family or friend will provide       Discharge Needs Assessment    Readmission Within the Last 30 Days  no previous admission in last 30 days    Equipment Currently Used at Home  none    Concerns to be Addressed  no discharge needs identified;denies needs/concerns at this time    Anticipated Changes Related to Illness  none    Equipment Needed After Discharge  none        Discharge Plan     Row Name 03/19/21 0802       Plan    Plan  Home    Plan Comments  Met & spoke with Mrs Ramirez at the bedside. She lives with her spouse in [facility]. At baseline, is ind with ADL's/mobility. Denies DME/HH/Rehab/Oxygen. She is employed full-time. POD #1 R VATS on 3/18. CT's to suction. O2 2LNC. Per Dr Cevallos's note this morning, will be DC IVF & arroyo cath, and ambulate. Orders to transfer from PCU to tele. Waiting on pathology. Plans are home with spouse to transport.    Final Discharge Disposition Code  01 - home or self-care        Continued Care and Services - Admitted Since 3/18/2021    Coordination has not been started for this encounter.         Demographic Summary     Row Name 03/19/21 0800       General Information    Admission Type  inpatient    General Information Comments  Met & spoke with Mrs Ramirez at the bedside. Verified PCP is Dr Raines. Primary insurance  is [employer]. Has drug coverage.        Functional Status     Row Name 03/19/21 0801       Functional Status, IADL    Medications  independent    Meal Preparation  independent    Housekeeping  independent    Laundry  independent    Shopping  independent        Psychosocial    No documentation.       Abuse/Neglect    No documentation.       Legal    No documentation.       Substance Abuse    No documentation.       Patient Forms    No documentation.           Emperatriz Morris RN

## 2021-03-20 ENCOUNTER — APPOINTMENT (OUTPATIENT)
Dept: GENERAL RADIOLOGY | Facility: HOSPITAL | Age: 51
End: 2021-03-20

## 2021-03-20 LAB
ALBUMIN SERPL-MCNC: 3 G/DL (ref 3.5–5.2)
ALBUMIN/GLOB SERPL: 1.4 G/DL
ALP SERPL-CCNC: 58 U/L (ref 39–117)
ALT SERPL W P-5'-P-CCNC: 7 U/L (ref 1–33)
ANION GAP SERPL CALCULATED.3IONS-SCNC: 4 MMOL/L (ref 5–15)
AST SERPL-CCNC: 14 U/L (ref 1–32)
BASOPHILS # BLD AUTO: 0.02 10*3/MM3 (ref 0–0.2)
BASOPHILS NFR BLD AUTO: 0.2 % (ref 0–1.5)
BILIRUB SERPL-MCNC: 0.3 MG/DL (ref 0–1.2)
BUN SERPL-MCNC: 14 MG/DL (ref 6–20)
BUN/CREAT SERPL: 23.3 (ref 7–25)
CALCIUM SPEC-SCNC: 8.2 MG/DL (ref 8.6–10.5)
CHLORIDE SERPL-SCNC: 107 MMOL/L (ref 98–107)
CO2 SERPL-SCNC: 28 MMOL/L (ref 22–29)
CREAT SERPL-MCNC: 0.6 MG/DL (ref 0.57–1)
DEPRECATED RDW RBC AUTO: 44.7 FL (ref 37–54)
EOSINOPHIL # BLD AUTO: 0.03 10*3/MM3 (ref 0–0.4)
EOSINOPHIL NFR BLD AUTO: 0.3 % (ref 0.3–6.2)
ERYTHROCYTE [DISTWIDTH] IN BLOOD BY AUTOMATED COUNT: 13.1 % (ref 12.3–15.4)
GFR SERPL CREATININE-BSD FRML MDRD: 105 ML/MIN/1.73
GLOBULIN UR ELPH-MCNC: 2.2 GM/DL
GLUCOSE SERPL-MCNC: 112 MG/DL (ref 65–99)
HCT VFR BLD AUTO: 31 % (ref 34–46.6)
HGB BLD-MCNC: 9.7 G/DL (ref 12–15.9)
IMM GRANULOCYTES # BLD AUTO: 0.05 10*3/MM3 (ref 0–0.05)
IMM GRANULOCYTES NFR BLD AUTO: 0.6 % (ref 0–0.5)
LYMPHOCYTES # BLD AUTO: 1.5 10*3/MM3 (ref 0.7–3.1)
LYMPHOCYTES NFR BLD AUTO: 16.9 % (ref 19.6–45.3)
MAGNESIUM SERPL-MCNC: 1.7 MG/DL (ref 1.6–2.6)
MCH RBC QN AUTO: 29.3 PG (ref 26.6–33)
MCHC RBC AUTO-ENTMCNC: 31.3 G/DL (ref 31.5–35.7)
MCV RBC AUTO: 93.7 FL (ref 79–97)
MONOCYTES # BLD AUTO: 0.84 10*3/MM3 (ref 0.1–0.9)
MONOCYTES NFR BLD AUTO: 9.4 % (ref 5–12)
NEUTROPHILS NFR BLD AUTO: 6.45 10*3/MM3 (ref 1.7–7)
NEUTROPHILS NFR BLD AUTO: 72.6 % (ref 42.7–76)
NRBC BLD AUTO-RTO: 0 /100 WBC (ref 0–0.2)
PHOSPHATE SERPL-MCNC: 2.8 MG/DL (ref 2.5–4.5)
PLATELET # BLD AUTO: 153 10*3/MM3 (ref 140–450)
PMV BLD AUTO: 10.7 FL (ref 6–12)
POTASSIUM SERPL-SCNC: 4.3 MMOL/L (ref 3.5–5.2)
PROT SERPL-MCNC: 5.2 G/DL (ref 6–8.5)
RBC # BLD AUTO: 3.31 10*6/MM3 (ref 3.77–5.28)
SODIUM SERPL-SCNC: 139 MMOL/L (ref 136–145)
WBC # BLD AUTO: 8.89 10*3/MM3 (ref 3.4–10.8)

## 2021-03-20 PROCEDURE — 83735 ASSAY OF MAGNESIUM: CPT | Performed by: INTERNAL MEDICINE

## 2021-03-20 PROCEDURE — 80053 COMPREHEN METABOLIC PANEL: CPT | Performed by: INTERNAL MEDICINE

## 2021-03-20 PROCEDURE — 25010000003 MAGNESIUM SULFATE 4 GM/100ML SOLUTION: Performed by: INTERNAL MEDICINE

## 2021-03-20 PROCEDURE — 99232 SBSQ HOSP IP/OBS MODERATE 35: CPT | Performed by: INTERNAL MEDICINE

## 2021-03-20 PROCEDURE — 25010000002 HEPARIN (PORCINE) PER 1000 UNITS: Performed by: PHYSICIAN ASSISTANT

## 2021-03-20 PROCEDURE — 84100 ASSAY OF PHOSPHORUS: CPT | Performed by: INTERNAL MEDICINE

## 2021-03-20 PROCEDURE — 85025 COMPLETE CBC W/AUTO DIFF WBC: CPT | Performed by: INTERNAL MEDICINE

## 2021-03-20 PROCEDURE — 25010000002 MORPHINE PER 10 MG: Performed by: THORACIC SURGERY (CARDIOTHORACIC VASCULAR SURGERY)

## 2021-03-20 PROCEDURE — 71045 X-RAY EXAM CHEST 1 VIEW: CPT

## 2021-03-20 RX ORDER — MAGNESIUM SULFATE HEPTAHYDRATE 40 MG/ML
4 INJECTION, SOLUTION INTRAVENOUS AS NEEDED
Status: DISCONTINUED | OUTPATIENT
Start: 2021-03-20 | End: 2021-03-22 | Stop reason: HOSPADM

## 2021-03-20 RX ORDER — FAMOTIDINE 20 MG/1
20 TABLET, FILM COATED ORAL
Status: DISCONTINUED | OUTPATIENT
Start: 2021-03-20 | End: 2021-03-22 | Stop reason: HOSPADM

## 2021-03-20 RX ORDER — ALBUTEROL SULFATE 2.5 MG/3ML
2.5 SOLUTION RESPIRATORY (INHALATION) EVERY 6 HOURS PRN
Status: DISCONTINUED | OUTPATIENT
Start: 2021-03-20 | End: 2021-03-22 | Stop reason: HOSPADM

## 2021-03-20 RX ORDER — BUMETANIDE 0.25 MG/ML
2 INJECTION INTRAMUSCULAR; INTRAVENOUS ONCE
Status: COMPLETED | OUTPATIENT
Start: 2021-03-20 | End: 2021-03-20

## 2021-03-20 RX ORDER — MAGNESIUM SULFATE HEPTAHYDRATE 40 MG/ML
2 INJECTION, SOLUTION INTRAVENOUS AS NEEDED
Status: DISCONTINUED | OUTPATIENT
Start: 2021-03-20 | End: 2021-03-22 | Stop reason: HOSPADM

## 2021-03-20 RX ORDER — POTASSIUM CHLORIDE 750 MG/1
40 CAPSULE, EXTENDED RELEASE ORAL AS NEEDED
Status: DISCONTINUED | OUTPATIENT
Start: 2021-03-20 | End: 2021-03-22 | Stop reason: HOSPADM

## 2021-03-20 RX ORDER — POTASSIUM CHLORIDE 1.5 G/1.77G
40 POWDER, FOR SOLUTION ORAL AS NEEDED
Status: DISCONTINUED | OUTPATIENT
Start: 2021-03-20 | End: 2021-03-22 | Stop reason: HOSPADM

## 2021-03-20 RX ADMIN — HYDROCODONE BITARTRATE AND ACETAMINOPHEN 1 TABLET: 5; 325 TABLET ORAL at 08:41

## 2021-03-20 RX ADMIN — DOCUSATE SODIUM 50MG AND SENNOSIDES 8.6MG 2 TABLET: 8.6; 5 TABLET, FILM COATED ORAL at 21:23

## 2021-03-20 RX ADMIN — FAMOTIDINE 20 MG: 20 TABLET, FILM COATED ORAL at 21:23

## 2021-03-20 RX ADMIN — MORPHINE SULFATE 4 MG: 4 INJECTION, SOLUTION INTRAMUSCULAR; INTRAVENOUS at 21:22

## 2021-03-20 RX ADMIN — HEPARIN SODIUM 5000 UNITS: 5000 INJECTION, SOLUTION INTRAVENOUS; SUBCUTANEOUS at 08:35

## 2021-03-20 RX ADMIN — BUMETANIDE 2 MG: 0.25 INJECTION, SOLUTION INTRAMUSCULAR; INTRAVENOUS at 21:22

## 2021-03-20 RX ADMIN — MORPHINE SULFATE 2 MG: 4 INJECTION, SOLUTION INTRAMUSCULAR; INTRAVENOUS at 05:06

## 2021-03-20 RX ADMIN — HEPARIN SODIUM 5000 UNITS: 5000 INJECTION, SOLUTION INTRAVENOUS; SUBCUTANEOUS at 21:23

## 2021-03-20 RX ADMIN — MORPHINE SULFATE 4 MG: 4 INJECTION, SOLUTION INTRAMUSCULAR; INTRAVENOUS at 13:38

## 2021-03-20 RX ADMIN — DOCUSATE SODIUM 50MG AND SENNOSIDES 8.6MG 2 TABLET: 8.6; 5 TABLET, FILM COATED ORAL at 08:35

## 2021-03-20 RX ADMIN — MAGNESIUM SULFATE HEPTAHYDRATE 4 G: 40 INJECTION, SOLUTION INTRAVENOUS at 14:40

## 2021-03-20 RX ADMIN — FAMOTIDINE 20 MG: 20 TABLET, FILM COATED ORAL at 16:51

## 2021-03-20 RX ADMIN — SODIUM CHLORIDE 30 ML/HR: 9 INJECTION, SOLUTION INTRAVENOUS at 21:17

## 2021-03-20 NOTE — PROGRESS NOTES
[facility] Medicine Services  PROGRESS NOTE    Patient Name: Birgit Ramirez  : 1970  MRN: [id number]    Date of Admission: 3/18/2021  Primary Care Physician: Jose Raines MD    Subjective   Subjective     CC:  Medical Management, post-op    HPI:  Patient sitting up in bed, no issues overnight. Chest tube remains in place. Pain is controlled this morning.    ROS:  Gen- No fevers, chills  CV- No chest pain, palpitations  Resp- No cough, dyspnea  GI- No N/V/D, abd pain    Objective   Objective     Vital Signs:   Temp:  [97.9 °F (36.6 °C)-98.7 °F (37.1 °C)] 98.2 °F (36.8 °C)  Heart Rate:  [] 79  Resp:  [18] 18  BP: (103-121)/(53-76) 114/59        Physical Exam:  Constitutional: No acute distress, awake, alert  HENT: NCAT, mucous membranes moist  Respiratory: Clear to auscultation bilaterally, respiratory effort normal, chest tube in place  Cardiovascular: RRR, no murmurs, rubs, or gallops  Gastrointestinal: Positive bowel sounds, soft, nontender, nondistended  Musculoskeletal: No bilateral ankle edema  Psychiatric: Appropriate affect, cooperative  Neurologic: Oriented x 3, strength symmetric in all extremities, Cranial Nerves grossly intact to confrontation, speech clear  Skin: No rashes    Results Reviewed:  Results from last 7 days   Lab Units 21  1243 21  0541 21  0946   WBC 10*3/mm3 8.89 10.69 9.55 5.38   HEMOGLOBIN g/dL 9.7* 10.7* 10.3* 12.0   HEMATOCRIT % 31.0* 34.2 32.7* 38.2   PLATELETS 10*3/mm3 153 192 176 187   INR   --   --   --  1.00     Results from last 7 days   Lab Units 21  02421  1243 21  0541 21  0946   SODIUM mmol/L 139  --  138 141   POTASSIUM mmol/L 4.3  --  5.1 4.2   CHLORIDE mmol/L 107  --  103 106   CO2 mmol/L 28.0  --  27.0 25.0   BUN mg/dL 14  --  13 7   CREATININE mg/dL 0.60  --  0.80 0.71   GLUCOSE mg/dL 112*  --  112* 90   CALCIUM mg/dL 8.2*  --  8.2* 8.2*   ALT (SGPT) U/L 7  --   11 13   AST (SGOT) U/L 14  --  19 16   TROPONIN T ng/mL  --  <0.010  --   --      Estimated Creatinine Clearance: 141.3 mL/min (by C-G formula based on SCr of 0.6 mg/dL).    Microbiology Results Abnormal     None          Imaging Results (Last 24 Hours)     Procedure Component Value Units Date/Time    XR Chest 1 View [[id number]] Collected: 03/20/21 0825     Updated: 03/20/21 0829    Narrative:      EXAMINATION: XR CHEST 1 VW-      INDICATION: f/u; C34.11-Malignant neoplasm of upper lobe, right bronchus  or lung      COMPARISON: One day prior     FINDINGS: 2 right-sided chest tubes project unchanged. No new focal  airspace opacity. No distinct pneumothorax. Unchanged heart and  mediastinal contours.       Impression:      No significant interval change. No pneumothorax.     This report was finalized on 3/20/2021 8:26 AM by Octavio Vega.       XR Chest 1 View [[id number]] Collected: 03/19/21 0932     Updated: 03/19/21 2242    Narrative:      EXAMINATION: XR CHEST 1 VW- 03/19/2021     INDICATION: postop; C34.11-Malignant neoplasm of upper lobe, right  bronchus or lung     COMPARISON: 03/18/2021     FINDINGS: Post right thoracotomy changes are again noted. No  pneumothorax is seen. Lungs appear grossly clear. Heart and vasculature  appear normal in size. There is probably a small hiatal hernia.       Impression:      Stable post right thoracotomy chest with no visible  pneumothorax or new chest disease.      D:  03/19/2021  E:  03/19/2021     This report was finalized on 3/19/2021 10:39 PM by Dr. Luis Wyatt MD.       XR Chest 1 View [[id number]] Collected: 03/19/21 1305     Updated: 03/19/21 2235    Narrative:      EXAMINATION: XR CHEST 1 VW-     INDICATION: Chest pain; C34.11-Malignant neoplasm of upper lobe, right  bronchus or lung.      COMPARISON: 03/19/2021 4:22 AM.     FINDINGS: Study is timed 12:43 PM. Large bore right thoracotomy tubes  remain in place. No pneumothorax is seen. Lung volumes are a  little  decreased, but no significant new atelectasis is seen. Heart and  vasculature appear normal in size.       Impression:      Right thoracotomy tubes remain in place. No evidence of  pneumothorax or other new chest disease.      D:  03/19/2021  E:  03/19/2021     This report was finalized on 3/19/2021 10:32 PM by Dr. Luis Wyatt MD.                 I have reviewed the medications:  Scheduled Meds:heparin (porcine), 5,000 Units, Subcutaneous, Q12H  sennosides-docusate, 2 tablet, Oral, BID      Continuous Infusions:lactated ringers, 9 mL/hr, Last Rate: 800 mL/hr (03/18/21 1048)  sodium chloride, 30 mL/hr, Last Rate: 30 mL/hr (03/18/21 1323)      PRN Meds:.•  acetaminophen  •  HYDROcodone-acetaminophen  •  ketorolac  •  Morphine  •  Morphine  •  ondansetron **OR** ondansetron    Assessment/Plan   Assessment & Plan     Active Hospital Problems    Diagnosis  POA   • **Malignant neoplasm of upper lobe of right lung (CMS/HCC) [C34.11]  Unknown      Resolved Hospital Problems   No resolved problems to display.        Brief Hospital Course to date:  Birgit Ramirez is a 51 y.o. female with PMHx significant for Crohn's, Adenocarcinoma of the Right Lung. Patient underwent CT guided Bx on 2/4/21 which verified pulmonary adenocarcinoma of the RUL. She subsequently had PET scan and MRI of brain which showed no metastatic disease. She was admitted and underwent VATS w/ RUL lobectomy with Dr. Cevallos on 3/18. We are consulted for medical management.    Adenocarcinoma of the RUL s/p VATS and RUL Lobectomy  - Dr. Cevallos following, management per primary team.  - continue pain control  - bowel regimen    DVT ppx: Heparin    CODE STATUS:   Code Status and Medical Interventions:   Ordered at: 03/18/21 1110     Code Status:    CPR     Medical Interventions (Level of Support Prior to Arrest):    Full       Jdae Hill, DO  03/20/21

## 2021-03-20 NOTE — PROGRESS NOTES
INTENSIVIST / PULMONARY FOLLOW UP NOTE     Hospital:  LOS: 2 days   Ms. Birgit Ramirez, 51 y.o. female is followed for:     Malignant neoplasm of upper lobe of right lung (CMS/HCC)          SUBJECTIVE     Appetite remains poor but denies nausea.  Has not had a bowel movement since surgery.  Incisional pain is present but tolerable.  Denies productive cough.  Remains in sinus rhythm.    The patient's relevant past medical, surgical, family, and social history were reviewed    Allergies and medications were reviewed    ROS:  Per subjective, all other systems were reviewed and were negative        OBJECTIVE     Vital Sign Min/Max for last 24 hours:  Temp  Min: 97.9 °F (36.6 °C)  Max: 98.7 °F (37.1 °C)   BP  Min: 102/76  Max: 121/70   Pulse  Min: 78  Max: 103   Resp  Min: 18  Max: 18   SpO2  Min: 87 %  Max: 99 %   No data recorded     Physical Exam:  General Appearance:  Conversant, in no acute distress  Eyes: Conjunctiva pink, no jaundice  Ears, Nose, Mouth, Throat:  Atraumatic, oropharynx clear, mucosa moist  Neck:  Trachea midline, thyroid normal  Respiratory: Right thoracotomy incision intact.  Right chest tube with small air leak.  No rhonchi or wheezing  Cardiovascular:  Regular rate and rhythm, no murmurs, no peripheral edema, no rub  Gastrointestinal:  Soft, nontender, nondistended, no hepatosplenomegaly  Skin:  Normal temperature, no rash  Psychiatric:  Alert and oriented x 3, normal judgement and insight  Neuro:  No new focal neurologic deficits observed    Telemetry: Sinus rhythm             Hemodynamics:   CVP:     PAP:     PAOP:     CO:     CI:     SVI:     SVR:       SpO2: 99 % SpO2  Min: 87 %  Max: 99 %   Device:      Flow Rate:   No data recorded     Mechanical Ventilator Settings:                                         Intake/Ouptut 24 hrs (7:00AM - 6:59 AM)  Intake & Output (last 3 days)       03/17 0701 - 03/18 0700 03/18 0701 - 03/19 0700 03/19 0701 - 03/20 0700 03/20 0701 - 03/21 0700    P.O.   600 480 360    I.V. (mL/kg)  368 (3.9)      IV Piggyback  100      Total Intake(mL/kg)  1068 (11.3) 480 (5.1) 360 (3.8)    Urine (mL/kg/hr)  825 (0.4) 0 (0) 375 (0.5)    Blood  300      Chest Tube  435 320 130    Total Output  1560 320 505    Net  -492 +160 -145            Urine Unmeasured Occurrence   1 x           Lines, Drains & Airways    Active LDAs     Name:   Placement date:   Placement time:   Site:   Days:    Peripheral IV 03/18/21 0624 Left;Posterior Hand   03/18/21 0624    Hand   less than 1    Urethral Catheter Straight-tip 16 Fr.   03/18/21    0720     less than 1    ETT    03/18/21    0714 created via procedure documentation     less than 1                Hematology:  Results from last 7 days   Lab Units 03/20/21  0241 03/19/21  1243 03/19/21  0541 03/17/21  0946   WBC 10*3/mm3 8.89 10.69 9.55 5.38   HEMOGLOBIN g/dL 9.7* 10.7* 10.3* 12.0   HEMATOCRIT % 31.0* 34.2 32.7* 38.2   PLATELETS 10*3/mm3 153 192 176 187     Electrolytes, Magnesium and Phosphorus:  Results from last 7 days   Lab Units 03/20/21  0241 03/19/21  0541 03/17/21  0946   SODIUM mmol/L 139 138 141   CHLORIDE mmol/L 107 103 106   POTASSIUM mmol/L 4.3 5.1 4.2   CO2 mmol/L 28.0 27.0 25.0   MAGNESIUM mg/dL 1.7 1.9  --    PHOSPHORUS mg/dL 2.8 4.6*  --      Renal:  Results from last 7 days   Lab Units 03/20/21  0241 03/19/21  0541 03/17/21  0946   CREATININE mg/dL 0.60 0.80 0.71   BUN mg/dL 14 13 7     Estimated Creatinine Clearance: 141.3 mL/min (by C-G formula based on SCr of 0.6 mg/dL).  Hepatic:  Results from last 7 days   Lab Units 03/20/21  0241 03/19/21  0541 03/17/21  0946   ALK PHOS U/L 58 62 89   BILIRUBIN mg/dL 0.3 0.6 0.5   BILIRUBIN DIRECT mg/dL  --   --  <0.2   ALT (SGPT) U/L 7 11 13   AST (SGOT) U/L 14 19 16     Arterial Blood Gases:        Results from last 7 days   Lab Units 03/17/21  0946   HEMOGLOBIN A1C % 5.40       No results found for: LACTATE    Relevant imaging studies and labs from 03/20/21 were reviewed and  interpreted by me    Medications (drips):  lactated ringers, Last Rate: 800 mL/hr (03/18/21 1048)  sodium chloride, Last Rate: 30 mL/hr (03/18/21 1323)        heparin (porcine), 5,000 Units, Subcutaneous, Q12H  sennosides-docusate, 2 tablet, Oral, BID        Assessment/Plan   IMPRESSION / PLAN     Inpatient Problem List:  51 y.o.female:  Active Hospital Problems    Diagnosis    • **Malignant neoplasm of upper lobe of right lung (CMS/HCC)         Impression:  51 y.o.female former smoker, with a past medical history of Crohn's, Left Lower Lobe Pneumonia and Adenocarcinoma of the Right Lung. She was seen by Pulmonology, Dr. Kenny, in clinic for follow up after an repeat CT for pneumonia showed a cavitary lesion in June 2020. In January 2021, she had her 6 month follow up CT Scan which revealed 2.5 cm subsolid nodule in the right upper lobe which increased in size since her previous scan. On 02/04/2021 a CT guided biopsy was performed and verified Primary Adenocarcinoma of the Right Lung, subsequently PET scan and MRI of brain showed no metastatic disease, patient was referred Cardiothoracic Surgery.  Now admitted 3/18/2021 post op VATS RUL Lobectomy by Dr. Cevallos.  She is currently on 2 L nasal cannula with adequate oxygen saturation.  She remains in sinus rhythm.  She has a small air leak.    Plan:    Continue chest tube to suction  Mobilize  Encourage incentive spirometry  Nebulized bronchodilators as needed  Add dietary supplements  Replace magnesium    DVT / PUD prophylaxis subcutaneous heparin, start Pepcid    Nutrition - Diet Regular; Cardiac               Margarette Carr MD    Intensive Care Medicine  03/20/21 15:09 EDT

## 2021-03-20 NOTE — PROGRESS NOTES
Cardiothoracic Surgery Progress Note      POD # 2 s/p Right VATS upper lobectomy     LOS: 2 days      Subjective:  No complaints today.      Objective:  Vital Signs  Temp:  [97.9 °F (36.6 °C)-98.7 °F (37.1 °C)] 98.3 °F (36.8 °C)  Heart Rate:  [] 79  Resp:  [18] 18  BP: (100-121)/(53-76) 109/60    Physical Exam:   General Appearance: alert, appears stated age and cooperative   Lungs: clear to auscultation, respirations regular, respirations even and respirations unlabored   Heart: regular rhythm & normal rate, normal S1, S2 and no murmur, no gallop, no rub   Skin: Incision c/d/i   No air leak with cough    Results:    Results from last 7 days   Lab Units 03/20/21  0241   WBC 10*3/mm3 8.89   HEMOGLOBIN g/dL 9.7*   HEMATOCRIT % 31.0*   PLATELETS 10*3/mm3 153     Results from last 7 days   Lab Units 03/20/21  0241   SODIUM mmol/L 139   POTASSIUM mmol/L 4.3   CHLORIDE mmol/L 107   CO2 mmol/L 28.0   BUN mg/dL 14   CREATININE mg/dL 0.60   GLUCOSE mg/dL 112*   CALCIUM mg/dL 8.2*       Assessment:  POD # 2 s/p Right VATS upper lobectomy  320 mL of drainage in 24 hours    Plan:  Diuresis  Ambulate  Pulmonary toilet  Continue chest tube to suction, remove when drainage down  Await final pathology    03/20/21  07:20 EDT

## 2021-03-20 NOTE — PLAN OF CARE
Goal Outcome Evaluation:  Plan of Care Reviewed With: patient  Progress: improving  Outcome Summary: VSS. RA. Patient has required two doses of PRN analgesics tonight for pain control. Chest tube has had moderate output. Airleak noeted. Dressing CDI. Patient ambulated earlier tonight, but did not want to do as much as earlier today. No acute distress noted, will continue to monitor patient at this time. Patient pending transfer to telemetry bed.

## 2021-03-20 NOTE — NURSING NOTE
Patient's VSS, SR and 2L NC.  this shift. CT to suction with total of 180 out. Patient received a pain pill and Morphine today with decent pain control. Ambulated in hallway twice today without any issues afterwards. Replaced Mg today.

## 2021-03-21 ENCOUNTER — APPOINTMENT (OUTPATIENT)
Dept: GENERAL RADIOLOGY | Facility: HOSPITAL | Age: 51
End: 2021-03-21

## 2021-03-21 LAB
BH BB BLOOD EXPIRATION DATE: NORMAL
BH BB BLOOD EXPIRATION DATE: NORMAL
BH BB BLOOD TYPE BARCODE: 7300
BH BB BLOOD TYPE BARCODE: 7300
BH BB DISPENSE STATUS: NORMAL
BH BB DISPENSE STATUS: NORMAL
BH BB PRODUCT CODE: NORMAL
BH BB PRODUCT CODE: NORMAL
BH BB UNIT NUMBER: NORMAL
BH BB UNIT NUMBER: NORMAL
CROSSMATCH INTERPRETATION: NORMAL
CROSSMATCH INTERPRETATION: NORMAL
MAGNESIUM SERPL-MCNC: 2 MG/DL (ref 1.6–2.6)
UNIT  ABO: NORMAL
UNIT  ABO: NORMAL
UNIT  RH: NORMAL
UNIT  RH: NORMAL

## 2021-03-21 PROCEDURE — 25010000002 HEPARIN (PORCINE) PER 1000 UNITS: Performed by: PHYSICIAN ASSISTANT

## 2021-03-21 PROCEDURE — 99232 SBSQ HOSP IP/OBS MODERATE 35: CPT | Performed by: INTERNAL MEDICINE

## 2021-03-21 PROCEDURE — 83735 ASSAY OF MAGNESIUM: CPT | Performed by: THORACIC SURGERY (CARDIOTHORACIC VASCULAR SURGERY)

## 2021-03-21 PROCEDURE — 71045 X-RAY EXAM CHEST 1 VIEW: CPT

## 2021-03-21 PROCEDURE — 94799 UNLISTED PULMONARY SVC/PX: CPT

## 2021-03-21 PROCEDURE — 94640 AIRWAY INHALATION TREATMENT: CPT

## 2021-03-21 PROCEDURE — 25010000002 MORPHINE PER 10 MG: Performed by: PHYSICIAN ASSISTANT

## 2021-03-21 RX ORDER — ALBUTEROL SULFATE 2.5 MG/3ML
2.5 SOLUTION RESPIRATORY (INHALATION)
Status: DISCONTINUED | OUTPATIENT
Start: 2021-03-21 | End: 2021-03-22 | Stop reason: HOSPADM

## 2021-03-21 RX ORDER — BUMETANIDE 0.25 MG/ML
2 INJECTION INTRAMUSCULAR; INTRAVENOUS ONCE
Status: COMPLETED | OUTPATIENT
Start: 2021-03-21 | End: 2021-03-21

## 2021-03-21 RX ADMIN — DOCUSATE SODIUM 50MG AND SENNOSIDES 8.6MG 2 TABLET: 8.6; 5 TABLET, FILM COATED ORAL at 08:11

## 2021-03-21 RX ADMIN — FAMOTIDINE 20 MG: 20 TABLET, FILM COATED ORAL at 08:11

## 2021-03-21 RX ADMIN — ALBUTEROL SULFATE 2.5 MG: 2.5 SOLUTION RESPIRATORY (INHALATION) at 19:46

## 2021-03-21 RX ADMIN — HYDROCODONE BITARTRATE AND ACETAMINOPHEN 1 TABLET: 5; 325 TABLET ORAL at 20:38

## 2021-03-21 RX ADMIN — HYDROCODONE BITARTRATE AND ACETAMINOPHEN 1 TABLET: 5; 325 TABLET ORAL at 02:26

## 2021-03-21 RX ADMIN — HEPARIN SODIUM 5000 UNITS: 5000 INJECTION, SOLUTION INTRAVENOUS; SUBCUTANEOUS at 08:11

## 2021-03-21 RX ADMIN — MORPHINE SULFATE 2 MG: 4 INJECTION, SOLUTION INTRAMUSCULAR; INTRAVENOUS at 12:08

## 2021-03-21 RX ADMIN — MORPHINE SULFATE 2 MG: 4 INJECTION, SOLUTION INTRAMUSCULAR; INTRAVENOUS at 18:41

## 2021-03-21 RX ADMIN — BUMETANIDE 2 MG: 0.25 INJECTION, SOLUTION INTRAMUSCULAR; INTRAVENOUS at 16:49

## 2021-03-21 RX ADMIN — HYDROCODONE BITARTRATE AND ACETAMINOPHEN 1 TABLET: 5; 325 TABLET ORAL at 15:10

## 2021-03-21 RX ADMIN — HEPARIN SODIUM 5000 UNITS: 5000 INJECTION, SOLUTION INTRAVENOUS; SUBCUTANEOUS at 20:09

## 2021-03-21 RX ADMIN — ALBUTEROL SULFATE 2.5 MG: 2.5 SOLUTION RESPIRATORY (INHALATION) at 16:11

## 2021-03-21 RX ADMIN — HYDROCODONE BITARTRATE AND ACETAMINOPHEN 1 TABLET: 5; 325 TABLET ORAL at 08:11

## 2021-03-21 RX ADMIN — FAMOTIDINE 20 MG: 20 TABLET, FILM COATED ORAL at 16:21

## 2021-03-21 RX ADMIN — MORPHINE SULFATE 2 MG: 4 INJECTION, SOLUTION INTRAMUSCULAR; INTRAVENOUS at 23:23

## 2021-03-21 NOTE — PROGRESS NOTES
[facility] Medicine Services  CLINICAL REVIEW NOTE    Patient Name: Birgit Ramirez  : 1970  MRN: [id number]    Date of Admission: 3/18/2021  Length of Stay: 3  Attending Service:  Dr. Cevallos        Patient's labs, charting, and events reviewed.  No active medical management issues to address today, the Hospitalist team will continue to follow daily, be available for any needs, and see or bill for services as needed.

## 2021-03-21 NOTE — PLAN OF CARE
Goal Outcome Evaluation:        Outcome Summary: VSS on room air. Patient ambulating with increasing distance on room air and tolerating well. IV and po pain medication given - chest tube with 120 output this shift. Bumex given x 1. OK to transfer to [facility] per order, awaiting bed. RN will continue to monitor.

## 2021-03-21 NOTE — PROGRESS NOTES
Cardiothoracic Surgery Progress Note      POD # 3 s/p Right VATS upper lobectomy     LOS: 3 days      Subjective:  No complaints today.      Objective:  Vital Signs  Temp:  [98.2 °F (36.8 °C)-98.5 °F (36.9 °C)] 98.2 °F (36.8 °C)  Heart Rate:  [] 90  Resp:  [18] 18  BP: (102-123)/(46-78) 116/66    Physical Exam:   General Appearance: alert, appears stated age and cooperative   Lungs: clear to auscultation, respirations regular, respirations even and respirations unlabored   Heart: regular rhythm & normal rate, normal S1, S2 and no murmur, no gallop, no rub   Skin: Incision c/d/i   No air leak with cough    Results:    Results from last 7 days   Lab Units 03/20/21  0241   WBC 10*3/mm3 8.89   HEMOGLOBIN g/dL 9.7*   HEMATOCRIT % 31.0*   PLATELETS 10*3/mm3 153     Results from last 7 days   Lab Units 03/20/21  0241   SODIUM mmol/L 139   POTASSIUM mmol/L 4.3   CHLORIDE mmol/L 107   CO2 mmol/L 28.0   BUN mg/dL 14   CREATININE mg/dL 0.60   GLUCOSE mg/dL 112*   CALCIUM mg/dL 8.2*       Assessment:  POD # 3 s/p Right VATS upper lobectomy  360 mL of drainage in 24 hours    Plan:  Transfer to telemetry from PCU  Diuresis  Ambulate  Pulmonary toilet  Continue chest tube to suction, remove when drainage down  Await final pathology    03/21/21  08:10 EDT

## 2021-03-21 NOTE — PROGRESS NOTES
INTENSIVIST / PULMONARY FOLLOW UP NOTE     Hospital:  LOS: 3 days   Ms. Birgit Ramirez, 51 y.o. female is followed for:     Malignant neoplasm of upper lobe of right lung (CMS/HCC)          SUBJECTIVE     Tolerating p.o. diet better.  Remains afebrile.  Weaned to room air with a saturation of 96%.  Chest tube output 360 mL yesterday.  Patient did ambulate in her room.  She is tolerating a p.o. diet.    The patient's relevant past medical, surgical, family, and social history were reviewed. .    Allergies and medications were reviewed    ROS:  Per subjective, all other systems were reviewed and were negative        OBJECTIVE     Vital Sign Min/Max for last 24 hours:  Temp  Min: 98 °F (36.7 °C)  Max: 98.5 °F (36.9 °C)   BP  Min: 106/63  Max: 123/70   Pulse  Min: 84  Max: 124   Resp  Min: 18  Max: 18   SpO2  Min: 94 %  Max: 99 %   No data recorded     Physical Exam:  General Appearance:  Conversant, in no acute distress  Eyes: Conjunctiva pink, no jaundice  Ears, Nose, Mouth, Throat:  Atraumatic, oropharynx clear, mucosa moist  Neck:  Trachea midline, thyroid normal  Respiratory: Right thoracotomy incision intact.  Right chest tube with small air leak.  Scattered expiratory rhonchi  Cardiovascular:  Regular rate and rhythm, no murmurs, no peripheral edema, no rub  Gastrointestinal:  Soft, nontender, nondistended, no hepatosplenomegaly  Skin:  Normal temperature, no rash  Psychiatric:  Alert and oriented x 3, normal judgement and insight  Neuro:  No new focal neurologic deficits observed    Telemetry: Sinus rhythm             Hemodynamics:   CVP:     PAP:     PAOP:     CO:     CI:     SVI:     SVR:       SpO2: 96 % SpO2  Min: 94 %  Max: 99 %   Device:      Flow Rate:   No data recorded     Mechanical Ventilator Settings:                                         Intake/Ouptut 24 hrs (7:00AM - 6:59 AM)  Intake & Output (last 3 days)       03/18 0701 - 03/19 0700 03/19 0701 - 03/20 0700 03/20 0701 - 03/21 0700 03/21 0701  - 03/22 0700    P.O. 600 480 360 480    I.V. (mL/kg) 368 (3.9)  344.5 (3.7)     IV Piggyback 100       Total Intake(mL/kg) 1068 (11.3) 480 (5.1) 704.5 (7.5) 480 (5.1)    Urine (mL/kg/hr) 825 (0.4) 0 (0) 2925 (1.3) 650 (1)    Blood 300       Chest Tube 435 320 360 10    Total Output 8490 419 8772 660    Net -492 +160 -2580.5 -180            Urine Unmeasured Occurrence  1 x 3 x           Lines, Drains & Airways    Active LDAs     Name:   Placement date:   Placement time:   Site:   Days:    Peripheral IV 03/18/21 0624 Left;Posterior Hand   03/18/21    0624    Hand   less than 1    Urethral Catheter Straight-tip 16 Fr.   03/18/21    0720     less than 1    ETT    03/18/21    0714 created via procedure documentation     less than 1                Hematology:  Results from last 7 days   Lab Units 03/20/21  0241 03/19/21  1243 03/19/21  0541 03/17/21  0946   WBC 10*3/mm3 8.89 10.69 9.55 5.38   HEMOGLOBIN g/dL 9.7* 10.7* 10.3* 12.0   HEMATOCRIT % 31.0* 34.2 32.7* 38.2   PLATELETS 10*3/mm3 153 192 176 187     Electrolytes, Magnesium and Phosphorus:  Results from last 7 days   Lab Units 03/21/21  0519 03/20/21  0241 03/19/21  0541 03/17/21  0946   SODIUM mmol/L  --  139 138 141   CHLORIDE mmol/L  --  107 103 106   POTASSIUM mmol/L  --  4.3 5.1 4.2   CO2 mmol/L  --  28.0 27.0 25.0   MAGNESIUM mg/dL 2.0 1.7 1.9  --    PHOSPHORUS mg/dL  --  2.8 4.6*  --      Renal:  Results from last 7 days   Lab Units 03/20/21  0241 03/19/21  0541 03/17/21  0946   CREATININE mg/dL 0.60 0.80 0.71   BUN mg/dL 14 13 7     Estimated Creatinine Clearance: 141.3 mL/min (by C-G formula based on SCr of 0.6 mg/dL).  Hepatic:  Results from last 7 days   Lab Units 03/20/21  0241 03/19/21  0541 03/17/21  0946   ALK PHOS U/L 58 62 89   BILIRUBIN mg/dL 0.3 0.6 0.5   BILIRUBIN DIRECT mg/dL  --   --  <0.2   ALT (SGPT) U/L 7 11 13   AST (SGOT) U/L 14 19 16     Arterial Blood Gases:        Results from last 7 days   Lab Units 03/17/21  0946   HEMOGLOBIN A1C %  5.40       No results found for: LACTATE    Relevant imaging studies and labs from 03/21/21 were reviewed and interpreted by me    No films reported today    Medications (drips):  lactated ringers, Last Rate: 800 mL/hr (03/18/21 1048)  sodium chloride, Last Rate: 30 mL/hr (03/21/21 0421)        famotidine, 20 mg, Oral, BID AC  heparin (porcine), 5,000 Units, Subcutaneous, Q12H  sennosides-docusate, 2 tablet, Oral, BID        Assessment/Plan   IMPRESSION / PLAN     Inpatient Problem List:  51 y.o.female:  Active Hospital Problems    Diagnosis    • **Malignant neoplasm of upper lobe of right lung (CMS/HCC)         Impression:  51 y.o.female former smoker, with a past medical history of Crohn's, Left Lower Lobe Pneumonia and Adenocarcinoma of the Right Lung. She was seen by Pulmonology, Dr. Kenny, in clinic for follow up after an repeat CT for pneumonia showed a cavitary lesion in June 2020. In January 2021, she had her 6 month follow up CT Scan which revealed 2.5 cm subsolid nodule in the right upper lobe which increased in size since her previous scan. On 02/04/2021 a CT guided biopsy was performed and verified Primary Adenocarcinoma of the Right Lung, subsequently PET scan and MRI of brain showed no metastatic disease, patient was referred Cardiothoracic Surgery.  Now admitted 3/18/2021 post op VATS RUL Lobectomy by Dr. Cevallos.  She is currently on 2 L nasal cannula with adequate oxygen saturation.  She does continue to have a small air leak.  She remains in sinus rhythm.     Plan:    Chest x-ray tomorrow  Tract down her home medication list  Encourage incentive spirometry  Pepcid for ulcer prophylaxis  Subcutaneous heparin for DVT prophylaxis  Restart nebulized bronchodilators  Telemetry    DVT / PUD prophylaxis subcutaneous heparin, start Pepcid    Nutrition - Diet Regular; Cardiac               Margarette Carr MD    Intensive Care Medicine  03/21/21 13:54 EDT

## 2021-03-22 ENCOUNTER — APPOINTMENT (OUTPATIENT)
Dept: GENERAL RADIOLOGY | Facility: HOSPITAL | Age: 51
End: 2021-03-22

## 2021-03-22 VITALS
OXYGEN SATURATION: 97 % | HEART RATE: 104 BPM | TEMPERATURE: 98.6 F | HEIGHT: 72 IN | SYSTOLIC BLOOD PRESSURE: 110 MMHG | WEIGHT: 208 LBS | BODY MASS INDEX: 28.17 KG/M2 | RESPIRATION RATE: 18 BRPM | DIASTOLIC BLOOD PRESSURE: 60 MMHG

## 2021-03-22 LAB
ANION GAP SERPL CALCULATED.3IONS-SCNC: 10 MMOL/L (ref 5–15)
BUN SERPL-MCNC: 7 MG/DL (ref 6–20)
BUN/CREAT SERPL: 10.3 (ref 7–25)
CALCIUM SPEC-SCNC: 8.4 MG/DL (ref 8.6–10.5)
CHLORIDE SERPL-SCNC: 99 MMOL/L (ref 98–107)
CO2 SERPL-SCNC: 31 MMOL/L (ref 22–29)
CREAT SERPL-MCNC: 0.68 MG/DL (ref 0.57–1)
DEPRECATED RDW RBC AUTO: 42.5 FL (ref 37–54)
ERYTHROCYTE [DISTWIDTH] IN BLOOD BY AUTOMATED COUNT: 12.4 % (ref 12.3–15.4)
GFR SERPL CREATININE-BSD FRML MDRD: 91 ML/MIN/1.73
GLUCOSE SERPL-MCNC: 102 MG/DL (ref 65–99)
HCT VFR BLD AUTO: 34.4 % (ref 34–46.6)
HGB BLD-MCNC: 10.9 G/DL (ref 12–15.9)
MAGNESIUM SERPL-MCNC: 1.6 MG/DL (ref 1.6–2.6)
MCH RBC QN AUTO: 29.3 PG (ref 26.6–33)
MCHC RBC AUTO-ENTMCNC: 31.7 G/DL (ref 31.5–35.7)
MCV RBC AUTO: 92.5 FL (ref 79–97)
PLATELET # BLD AUTO: 203 10*3/MM3 (ref 140–450)
PMV BLD AUTO: 10.2 FL (ref 6–12)
POTASSIUM SERPL-SCNC: 3.7 MMOL/L (ref 3.5–5.2)
RBC # BLD AUTO: 3.72 10*6/MM3 (ref 3.77–5.28)
SODIUM SERPL-SCNC: 140 MMOL/L (ref 136–145)
WBC # BLD AUTO: 8.02 10*3/MM3 (ref 3.4–10.8)

## 2021-03-22 PROCEDURE — 85027 COMPLETE CBC AUTOMATED: CPT | Performed by: INTERNAL MEDICINE

## 2021-03-22 PROCEDURE — 99232 SBSQ HOSP IP/OBS MODERATE 35: CPT | Performed by: INTERNAL MEDICINE

## 2021-03-22 PROCEDURE — 25010000003 MAGNESIUM SULFATE 4 GM/100ML SOLUTION: Performed by: PHYSICIAN ASSISTANT

## 2021-03-22 PROCEDURE — 80048 BASIC METABOLIC PNL TOTAL CA: CPT | Performed by: INTERNAL MEDICINE

## 2021-03-22 PROCEDURE — 25010000002 MORPHINE PER 10 MG: Performed by: PHYSICIAN ASSISTANT

## 2021-03-22 PROCEDURE — 71045 X-RAY EXAM CHEST 1 VIEW: CPT

## 2021-03-22 PROCEDURE — 83735 ASSAY OF MAGNESIUM: CPT | Performed by: INTERNAL MEDICINE

## 2021-03-22 PROCEDURE — 25010000002 HEPARIN (PORCINE) PER 1000 UNITS: Performed by: PHYSICIAN ASSISTANT

## 2021-03-22 PROCEDURE — 94799 UNLISTED PULMONARY SVC/PX: CPT

## 2021-03-22 RX ORDER — HYDROCODONE BITARTRATE AND ACETAMINOPHEN 5; 325 MG/1; MG/1
1 TABLET ORAL EVERY 6 HOURS PRN
Qty: 30 TABLET | Refills: 0 | Status: SHIPPED | OUTPATIENT
Start: 2021-03-22 | End: 2022-03-08

## 2021-03-22 RX ADMIN — FAMOTIDINE 20 MG: 20 TABLET, FILM COATED ORAL at 07:46

## 2021-03-22 RX ADMIN — FAMOTIDINE 20 MG: 20 TABLET, FILM COATED ORAL at 17:27

## 2021-03-22 RX ADMIN — HEPARIN SODIUM 5000 UNITS: 5000 INJECTION, SOLUTION INTRAVENOUS; SUBCUTANEOUS at 07:46

## 2021-03-22 RX ADMIN — MAGNESIUM SULFATE HEPTAHYDRATE 4 G: 40 INJECTION, SOLUTION INTRAVENOUS at 07:47

## 2021-03-22 RX ADMIN — MORPHINE SULFATE 2 MG: 4 INJECTION, SOLUTION INTRAMUSCULAR; INTRAVENOUS at 07:47

## 2021-03-22 RX ADMIN — HYDROCODONE BITARTRATE AND ACETAMINOPHEN 1 TABLET: 5; 325 TABLET ORAL at 17:27

## 2021-03-22 RX ADMIN — ALBUTEROL SULFATE 2.5 MG: 2.5 SOLUTION RESPIRATORY (INHALATION) at 07:59

## 2021-03-22 RX ADMIN — HYDROCODONE BITARTRATE AND ACETAMINOPHEN 1 TABLET: 5; 325 TABLET ORAL at 11:09

## 2021-03-22 RX ADMIN — ALBUTEROL SULFATE 2.5 MG: 2.5 SOLUTION RESPIRATORY (INHALATION) at 15:12

## 2021-03-22 RX ADMIN — HYDROCODONE BITARTRATE AND ACETAMINOPHEN 1 TABLET: 5; 325 TABLET ORAL at 04:53

## 2021-03-22 NOTE — PROGRESS NOTES
"INTENSIVIST NOTE     Hospital Day: 4    Ms. Birgit Ramirez, 51 y.o. female is followed for:   Perioperative management of comorbid medical conditions       SUBJECTIVE       Interval history:    Awake and alert.  Afebrile.  Fluid balance slightly negative.  Chest tubes out this morning.  Awaiting follow-up chest x-ray prior to possible discharge today.    The patient's relevant past medical, surgical and social history were reviewed and updated in Epic as appropriate.       OBJECTIVE     Vital Sign Min/Max for last 24 hours  Temp  Min: 98.2 °F (36.8 °C)  Max: 98.6 °F (37 °C)   BP  Min: 98/52  Max: 129/63   Pulse  Min: 93  Max: 114   Resp  Min: 18  Max: 20   SpO2  Min: 83 %  Max: 99 %   No data recorded   No data recorded      Intake/Output Summary (Last 24 hours) at 3/22/2021 1211  Last data filed at 3/22/2021 0600  Gross per 24 hour   Intake 1179 ml   Output 1580 ml   Net -401 ml      Flowsheet Rows      First Filed Value   Admission Height  182.9 cm (72.01\") Documented at 03/19/2021 0200   Admission Weight  94.3 kg (208 lb) Documented at 03/19/2021 0200             03/19/21  0200   Weight: 94.3 kg (208 lb)            Objective:  General Appearance:  In no acute distress.    Vital signs: (most recent): Blood pressure 113/62, pulse 110, temperature 98.6 °F (37 °C), temperature source Oral, resp. rate 18, height 182.9 cm (72.01\"), weight 94.3 kg (208 lb), SpO2 95 %, not currently breastfeeding.    HEENT: Normal HEENT exam.    Lungs:  Normal effort and normal respiratory rate.  She is not in respiratory distress.  There are decreased breath sounds.  No rales, wheezes or rhonchi.    Heart: Normal rate.  Regular rhythm.  S1 normal and S2 normal.  No murmur, gallop or friction rub.   Chest: Symmetric chest wall expansion. (Right thoracotomy wound without unusual drainage)  Abdomen: Abdomen is soft and non-distended.  Bowel sounds are normal.   There is no abdominal tenderness.   There is no mass. There is no " splenomegaly. There is no hepatomegaly.   Extremities: There is no deformity or dependent edema.    Neurological: Patient is alert and oriented to person, place and time.    Pupils:  Pupils are equal, round, and reactive to light.    Skin:  Warm and dry.              I reviewed the patient's new clinical results.  I reviewed the patient's new imaging results/reports including actual images and agree with reports.      Chest X-Ray: Follow-up chest x-ray pending    INFUSIONS       Results from last 7 days   Lab Units 03/22/21  0518 03/20/21  0241 03/19/21  1243   WBC 10*3/mm3 8.02 8.89 10.69   HEMOGLOBIN g/dL 10.9* 9.7* 10.7*   HEMATOCRIT % 34.4 31.0* 34.2   PLATELETS 10*3/mm3 203 153 192     Results from last 7 days   Lab Units 03/22/21  0518 03/21/21  0519 03/20/21  0241 03/19/21  0541   SODIUM mmol/L 140  --  139 138   POTASSIUM mmol/L 3.7  --  4.3 5.1   CHLORIDE mmol/L 99  --  107 103   CO2 mmol/L 31.0*  --  28.0 27.0   BUN mg/dL 7  --  14 13   GLUCOSE mg/dL 102*  --  112* 112*   CREATININE mg/dL 0.68  --  0.60 0.80   MAGNESIUM mg/dL 1.6 2.0 1.7 1.9   CALCIUM mg/dL 8.4*  --  8.2* 8.2*                 [facility] Ventilation:      I reviewed the patient's medications.    Assessment/Plan   ASSESSMENT/PLAN     Active Hospital Problems    Diagnosis    • **Malignant neoplasm of upper lobe of right lung (CMS/HCC)        51-year-old female status post right upper lobectomy on 3/18 for adenocarcinoma.  Has a past medical history of Crohn's disease.    Chest tubes removed today.  Awaiting follow-up x-ray and possible discharge home.    1. Follow-up chest x-ray later today  2. Possible discharge  3. Final pathology pending     I discussed the patient's findings and my recommendations with patient and nursing staff     Plan of care and goals reviewed with multidisciplinary team at daily rounds.    .    Nicko Reid MD  Pulmonary and Critical Care Medicine  03/22/21 12:11 EDT

## 2021-03-22 NOTE — PLAN OF CARE
Goal Outcome Evaluation:         Pt d/c home with post op instructions, verbalized understanding of teaching.

## 2021-03-22 NOTE — PROGRESS NOTES
Continued Stay Note   [facility]     Patient Name: Birgit Ramirez  MRN: [id number]  Today's Date: 3/22/2021    Admit Date: 3/18/2021    Discharge Plan     Row Name 03/22/21 0877       Plan    Plan  Home with 's assistance    Patient/Family in Agreement with Plan  yes    Plan Comments  Met with Ms. Ramirez and her , Mark Anthony, at the bedside for discharge planning follow up.  Ms. Ramirez is currently on room air.  She states that she will be discharged to home today, after her repeat CXR is reviewed.  She states that her  is availble to assist her at home as needed.  She denies any DME or home health needs.    Mark Anthony will be transporting his wife home when discharged.    CM will continue to follow.    Final Discharge Disposition Code  01 - home or self-care        Discharge Codes    No documentation.             Jing López RN

## 2021-03-22 NOTE — PROGRESS NOTES
Cardiothoracic Surgery Progress Note      POD # 4 s/p Right VATS upper lobectomy     LOS: 4 days      Subjective:  No complaints today    Objective:  Vital Signs  Temp:  [98 °F (36.7 °C)-98.4 °F (36.9 °C)] 98.4 °F (36.9 °C)  Heart Rate:  [] 105  Resp:  [18-20] 18  BP: ()/(52-73) 98/52    Physical Exam:   General Appearance: alert, appears stated age and cooperative   Lungs: clear to auscultation, respirations regular, respirations even and respirations unlabored   Heart: regular rhythm & normal rate, normal S1, S2 and no murmur, no gallop, no rub   Skin: Incision c/d/i   No air leak with cough    Results:    Results from last 7 days   Lab Units 03/22/21  0518   WBC 10*3/mm3 8.02   HEMOGLOBIN g/dL 10.9*   HEMATOCRIT % 34.4   PLATELETS 10*3/mm3 203     Results from last 7 days   Lab Units 03/22/21  0518   SODIUM mmol/L 140   POTASSIUM mmol/L 3.7   CHLORIDE mmol/L 99   CO2 mmol/L 31.0*   BUN mg/dL 7   CREATININE mg/dL 0.68   GLUCOSE mg/dL 102*   CALCIUM mg/dL 8.4*       Assessment:  POD # 4 s/p Right VATS upper lobectomy  210 mL of drainage in 24 hours    Plan:  D/C chest tube  Obtain post pull CXR  If CXR acceptable, D/C home today  Ambulate  Pulmonary toilet  Await final pathology    03/22/21  07:36 EDT

## 2021-03-22 NOTE — PLAN OF CARE
Goal Outcome Evaluation:  Plan of Care Reviewed With: patient  Progress: improving  Outcome Summary: VSS. RA. Patient did not want to ambulate tonight and verbalized that she had completed three trips on dayshift. Patient required a dose of Norco and morphine for adequate pain control. Patient is resting well at this time. No acute distress noted, will continue to monitor patient at this time.

## 2021-03-22 NOTE — PLAN OF CARE
Goal Outcome Evaluation:  Plan of Care Reviewed With: patient  Progress: improving  Outcome Summary: Pt VSS. On RA. Chest tubes dc'd. Possible DC home pending repeat chest x-ray. ALEKSANDAR Pal RN.

## 2021-03-23 ENCOUNTER — READMISSION MANAGEMENT (OUTPATIENT)
Dept: CALL CENTER | Facility: HOSPITAL | Age: 51
End: 2021-03-23

## 2021-03-23 LAB
CYTO UR: NORMAL
LAB AP CASE REPORT: NORMAL
LAB AP CLINICAL INFORMATION: NORMAL
Lab: NORMAL
PATH REPORT.FINAL DX SPEC: NORMAL
PATH REPORT.GROSS SPEC: NORMAL

## 2021-03-23 NOTE — OUTREACH NOTE
Prep Survey      Responses   [facility] facility patient discharged from?  [facility]   Is LACE score < 7 ?  No   Emergency Room discharge w/ pulse ox?  No   Eligibility  Readm Mgmt   Discharge diagnosis  Lung CA,  THORACOSCOPY VIDEO ASSISTED WITH RIGHT UPPER LOBECTOMY AND INTERCOSTAL CRYO ABLATION   Does the patient have one of the following disease processes/diagnoses(primary or secondary)?  Cardiothoracic surgery   Does the patient have Home health ordered?  No   Is there a DME ordered?  No   Prep survey completed?  Yes          Sary Deshpande RN

## 2021-03-24 ENCOUNTER — READMISSION MANAGEMENT (OUTPATIENT)
Dept: CALL CENTER | Facility: HOSPITAL | Age: 51
End: 2021-03-24

## 2021-03-24 NOTE — OUTREACH NOTE
CT Surgery Week 1 Survey      Responses   [facility] patient discharged from?  [place]   Does the patient have one of the following disease processes/diagnoses(primary or secondary)?  Cardiothoracic surgery   Week 1 attempt successful?  Yes   Call start time  1249   Call end time  1253   Discharge diagnosis  Lung CA,  THORACOSCOPY VIDEO ASSISTED WITH RIGHT UPPER LOBECTOMY AND INTERCOSTAL CRYO ABLATION   Is patient permission given to speak with other caregiver?  Yes   List who call center can speak with  [facility]   Meds reviewed with patient/caregiver?  Yes   Is the patient having any side effects they believe may be caused by any medication additions or changes?  No   Does the patient have all medications related to this admission filled (includes all antibiotics, pain medications, cardiac medications, etc.)  Yes   Prescription comments  she is waiting on call back from MD about pain meds.    Is the patient taking all medications as directed (includes completed medication regime)?  Yes   Medication comments  Pain at worst rated 7/10 with meds drops to 4/10 if she takes a Tylenol with Norco only lasts 4hrs.   Does the patient have a primary care provider?   Yes   Does the patient have an appointment scheduled with their C/T surgeon?  Yes   Has the patient kept scheduled appointments due by today?  N/A   Psychosocial issues?  No   Comments  Pt having pain, waiting on MD to call about that. She does have SOA with exertion but resolves when rests. Incision healing well,  is monitoring, no s/sx of inf.    Did the patient receive a copy of their discharge instructions?  Yes   Nursing interventions  Reviewed instructions with patient   What is the patient's perception of their health status since discharge?  Improving   Nursing interventions  Nurse provided patient education   Is the patient/caregiver able to teach back normal signs of recovery?  Pain or discomfort at incisional site   Nursing interventions   Reassured on normal signs of recovery   Is the patient /caregiver able to teach back basic post-op care?  Continue use of incentive spirometry at least 1 week post discharge, Practice 'cough and deep breath', Keep incision areas clean, dry and protected   Is the patient/caregiver able to teach back signs and symptoms of incisional infection?  Increased redness, swelling or pain at the incisonal site, Increased drainage or bleeding   Is the patient/caregiver able to teach back steps to recovery at home?  Set small, achievable goals for return to baseline health, Rest and rebuild strength, gradually increase activity   Is the patient/caregiver able to teach back the hierarchy of who to call/visit for symptoms/problems? PCP, Specialist, Home health nurse, Urgent Care, ED, 911  Yes   Week 1 call completed?  Yes            Myrna Barajas RN

## 2021-03-31 ENCOUNTER — READMISSION MANAGEMENT (OUTPATIENT)
Dept: CALL CENTER | Facility: HOSPITAL | Age: 51
End: 2021-03-31

## 2021-04-01 ENCOUNTER — MDT ASSESSMENT (OUTPATIENT)
Dept: ONCOLOGY | Facility: CLINIC | Age: 51
End: 2021-04-01

## 2021-04-01 NOTE — PROGRESS NOTES
CANCER CONFERENCE AGENDA  DATE:  04/06/2021      SPECIALTY:  Thoracic   PRESENTER:   Kvng Cevallos M.D.  SITE:   Lung            HPI:  [id number] who presented with hemoptysis with coughing and yellowish-green sputum.  Patient has lost 10 pounds within one week (no appetite).       REFERRING PROVIDER:  Jose Raines MD. ([facility] - [place], [place])       PLACE OF RESIDENCE:  [place], [place]       SOCIAL HISTORY:  Current every smoker 0.5 PPD x18 years (E-cigarettes).  She is single and employed ( for [employer]ology [employer])       FAMILY HISTORY:  Non-contributory       PAST MEDICAL HISTORY:  Crohn’s disease.       PAST SURGICAL HISTORY:  Colon resection, cholecystectomy, colonoscopy (2016).    PFTs (03/17/21):  FEV1 - 85%           DLCO - Not available     NEXT GEN SEQUENCING: Not available     IMAGING:    MRI Brain With & Without Contrast 3/2/2021  Essentially normal contrast-enhanced MRI of the brain with no evidence of parenchymal metastasis.       NM Pet Skull Base To Mid Thigh 3/2/2021  Soft tissue nodular focus in the right lung apex posteriorly appears potentially more prominent and solid component in the inferior portion although measures unchanged from 01/04/2021 comparison with low-level/equivocal hypermetabolism associated, maximum SUV 2.5, but greater than blood pool activity in the aorta concerning for potential malignancy. No evidence for abnormal hypermetabolism outside of this area is noted on the current exam.        CLINICAL STAGE:    T 1b, N 0, M 0 and stage IA2     SURGICAL PROCEDURE: 03/18/2021 ([facility]):  Bronchoscopy, right VATS, RUL lobectomy with mediastinal lymph node sampling - Invasive moderately differentiated adenocarcinoma.     PATHOLOGY:  Tumor site:  RUL.  Tumor size:  2 cm.  Pleural invasion: Not identified.  Lymphovascular invasion: Not identified.  Bronchial/vascular margins: Negative.  Nodes: 0/39 (2R, 4R, Station 7 & 11, Level 9) [id number]     TREATMENT PLAN  DISCUSSION:      Clinical Trials: No        Treatment Recommendations/Referrals:  Active surveillance    EVIDENCE BASED NATIONAL TREATMENT GUIDELINES:  National Comprehensive Cancer Network      Please discuss clinical/working stage, TNM, Stage Group, National Treatment Guidelines, and Prognostic Indicators.      Privileged and Confidential Patient Safety Work Product:  The information contained herein has been compiled as part of the [facility] Patient Safety Evaluation System and is deemed to be a Patient Safety Work Product and is privileged and confidential.  Disclaimer:  Multidisciplinary cancer conferences provide consultative services to formulate an effective treatment plan for patients and include physician representation from medical oncology, radiation oncology, radiology, surgery, and pathology.  AJCC or other appropriate staging is discussed, along with site-specific prognostic indicators and treatment planning used by evidence-based treatment guidelines.  Treatment plans which are discussed during conference may/may not necessarily be followed by the patient's managing physician(s), as there may be other factors taken into consideration which impact the treatment plan.  The specific treatment plan is ultimately determined on an individual basis by the patient and the physician(s) involved in his/her care.

## 2021-04-06 ENCOUNTER — OFFICE VISIT (OUTPATIENT)
Dept: CARDIAC SURGERY | Facility: CLINIC | Age: 51
End: 2021-04-06

## 2021-04-06 VITALS
HEIGHT: 72 IN | OXYGEN SATURATION: 95 % | WEIGHT: 206 LBS | TEMPERATURE: 97.8 F | SYSTOLIC BLOOD PRESSURE: 118 MMHG | HEART RATE: 91 BPM | BODY MASS INDEX: 27.9 KG/M2 | DIASTOLIC BLOOD PRESSURE: 70 MMHG

## 2021-04-06 DIAGNOSIS — C34.11 MALIGNANT NEOPLASM OF UPPER LOBE OF RIGHT LUNG (HCC): ICD-10-CM

## 2021-04-06 PROCEDURE — 71046 X-RAY EXAM CHEST 2 VIEWS: CPT | Performed by: NURSE PRACTITIONER

## 2021-04-06 PROCEDURE — 99024 POSTOP FOLLOW-UP VISIT: CPT | Performed by: NURSE PRACTITIONER

## 2021-04-06 RX ORDER — IBUPROFEN 600 MG/1
TABLET ORAL AS NEEDED
COMMUNITY
Start: 2021-04-05 | End: 2022-03-08

## 2021-04-06 NOTE — PROGRESS NOTES
[facility] Cardiothoracic Surgery Office Follow Up Note     Date of Encounter: 04/06/2021     MRN Number: [id number]  Name: Birgit Ramirez  Phone Number: [phone]     Referred By: No ref. provider found  PCP: Jose Raines MD    Chief Complaint:    Chief Complaint   Patient presents with   • Post-op Follow-up     Hosp D/C Right VAT's / Right Upper Lobectomy 3/18/21-Lung Cancer       History of Present Illness:    Birgit Ramirez is a 51 y.o. female with a history of Crohn's disease, former tobacco use and right upper lobe adenocarcinoma s/p right VATS with right upper lobectomy with mediastinal lymph node dissection on 3/18/2021 with Dr. Cevallos.  Pathology revealing invasive moderately differentiated adenocarcinoma T1BN0 stage IA2.  She presents today in postoperative follow-up.  She has been doing well and walking daily.  She does have continued right chest flank tenderness and numbness and using over-the-counter analgesics and heat with improvement.  She does have some shortness of breath but this is improving.  Review of Systems:  Review of Systems   Constitutional: Negative for chills, decreased appetite, fever and malaise/fatigue.   Cardiovascular: Positive for dyspnea on exertion. Negative for chest pain, claudication, irregular heartbeat, leg swelling, near-syncope, orthopnea, palpitations and syncope.   Respiratory: Positive for shortness of breath. Negative for cough, hemoptysis, sputum production and wheezing.    Hematologic/Lymphatic: Negative for bleeding problem. Does not bruise/bleed easily.   Skin: Negative for color change, poor wound healing and rash.   Musculoskeletal: Negative for back pain, falls and joint pain.   Gastrointestinal: Negative for abdominal pain, constipation, diarrhea, nausea and vomiting.   Neurological: Negative for focal weakness, numbness and paresthesias.   Psychiatric/Behavioral: Negative for depression. The patient does not have insomnia.        I  have reviewed the review of systems as entered by my clinical support staff and have updated it as appropriate.       Allergies:  No Known Allergies    Medications:      Current Outpatient Medications:   •  ibuprofen (ADVIL,MOTRIN) 600 MG tablet, As Needed., Disp: , Rfl:   •  Cyanocobalamin (VITAMIN B-12 IJ), Inject  as directed Every 30 (Thirty) Days., Disp: , Rfl:   •  HYDROcodone-acetaminophen (NORCO) 5-325 MG per tablet, Take 1 tablet by mouth Every 6 (Six) Hours As Needed for Moderate Pain ., Disp: 30 tablet, Rfl: 0    Social History     Socioeconomic History   • Marital status:      Spouse name: Not on file   • Number of children: 2   • Years of education: Not on file   • Highest education level: Not on file   Tobacco Use   • Smoking status: Former Smoker     Packs/day: 0.50     Years: 18.00     Pack years: 9.00     Types: Cigarettes, Electronic Cigarette     Quit date:      Years since quitting: 15.2   • Smokeless tobacco: Never Used   • Tobacco comment: quit cigarettes , vaped 10 years quit 2020   Vaping Use   • Vaping Use: Former   • Quit date: 2020   Substance and Sexual Activity   • Alcohol use: No   • Drug use: No   • Sexual activity: Defer       Family History   Problem Relation Age of Onset   • Crohn's disease Mother    • Breast cancer Mother    • Diabetes Father    • Hypertension Father    • Hyperlipidemia Father    • Peripheral vascular disease Father        Past Medical History:   Diagnosis Date   • Crohn's disease (CMS/HCC)    • Depression    • Lung cancer (CMS/HCC)    • Pneumonia        Past Surgical History:   Procedure Laterality Date   • BRONCHOSCOPY N/A 3/18/2021    Procedure: BRONCHOSCOPY;  Surgeon: Kvng Cevallos MD;  Location: [facility];  Service: Cardiothoracic;  Laterality: N/A;   •  SECTION      x2   • CHOLECYSTECTOMY     • COLON SURGERY     • COLONOSCOPY     • LUNG BIOPSY     • LYMPH NODE DISSECTION Right 3/18/2021    Procedure: MEDIASTINAL LYMPH NODE  "DISSECTION;  Surgeon: Kvng Cevallos MD;  Location: [facility] OR;  Service: Cardiothoracic;  Laterality: Right;   • THORACOSCOPY VIDEO ASSISTED WITH LOBECTOMY Right 3/18/2021    Procedure: THORACOSCOPY VIDEO ASSISTED WITH RIGHT UPPER LOBECTOMY AND INTERCOSTAL CRYO ABLATION;  Surgeon: Kvng Cevallos MD;  Location: [facility] OR;  Service: Cardiothoracic;  Laterality: Right;       Physical Exam:  Vital Signs:    Vitals:    04/06/21 1013   BP: 118/70   BP Location: Right arm   Patient Position: Sitting   Pulse: 91   Temp: 97.8 °F (36.6 °C)   SpO2: 95%   Weight: 93.4 kg (206 lb)   Height: 182.9 cm (72\")     Body mass index is 27.94 kg/m².     Physical Exam  Vitals and nursing note reviewed.   Constitutional:       Appearance: Normal appearance. She is well-developed and well-groomed.   HENT:      Head: Normocephalic and atraumatic.   Cardiovascular:      Rate and Rhythm: Normal rate and regular rhythm.      Heart sounds: Normal heart sounds, S1 normal and S2 normal. No murmur heard.   No friction rub.   Pulmonary:      Comments: Unlabored, Clear to auscultation bilaterally  Abdominal:      General: Bowel sounds are normal.      Palpations: Abdomen is soft.      Tenderness: There is no abdominal tenderness.   Musculoskeletal:      Cervical back: Neck supple.      Right lower leg: No edema.      Left lower leg: No edema.   Skin:     General: Skin is warm and dry.      Comments: Incisions: Right VATS site/MT sites intact  No surrounding erythema, hematoma or induration   Neurological:      Mental Status: She is alert and oriented to person, place, and time.   Psychiatric:         Attention and Perception: Attention normal.         Mood and Affect: Mood normal.         Speech: Speech normal.         Behavior: Behavior is cooperative.         Labs/Imaging:    Chest x-ray in office : Lungs fully expanded.  Sternal wires intact.  No pleural effusion/PTX.  Personally reviewed.  Official radiology read pending           Assessment / " Plan:  Diagnoses and all orders for this visit:    1. Malignant neoplasm of upper lobe of right lung (CMS/HCC)     Birgit Ramirez is a 51 y.o. female with a history of Crohn's disease, former tobacco use and right upper lobe adenocarcinoma s/p right VATS with right upper lobectomy with mediastinal lymph node dissection on 3/18/2021 with Dr. Cevallos.  Pathology revealing invasive moderately differentiated adenocarcinoma T1BN0 stage IA2.  Patient is doing well from a postoperative standpoint.  Stable incisions and chest x-ray in office today.  Presented at tumor board this morning and plans for active surveillance.  Will have patient to follow-up in 3 weeks for suture removal.  At that point if she continues to be stable we will initiate surveillance monitoring in 6 months with CT chest.      Jenny Green, GERBER  [facility] Cardiothoracic Surgery    Please note that portions of this note may have been completed with a voice recognition program. Efforts were made to edit the dictations, but occasionally words are mistranscribed.

## 2021-04-07 ENCOUNTER — READMISSION MANAGEMENT (OUTPATIENT)
Dept: CALL CENTER | Facility: HOSPITAL | Age: 51
End: 2021-04-07

## 2021-04-07 NOTE — OUTREACH NOTE
CT Surgery Week 3 Survey      Responses   [facility] patient discharged from?  [place]   Does the patient have one of the following disease processes/diagnoses(primary or secondary)?  Cardiothoracic surgery   Week 3 attempt successful?  Yes   Call start time  [phone]   Call end time  1234   Discharge diagnosis  Lung CA,  THORACOSCOPY VIDEO ASSISTED WITH RIGHT UPPER LOBECTOMY AND INTERCOSTAL CRYO ABLATION   Meds reviewed with patient/caregiver?  Yes   Is the patient having any side effects they believe may be caused by any medication additions or changes?  No   Does the patient have all medications related to this admission filled (includes all antibiotics, pain medications, cardiac medications, etc.)  Yes   Is the patient taking all medications as directed (includes completed medication regime)?  Yes   Does the patient have a primary care provider?   Yes   Does the patient have an appointment scheduled with their C/T surgeon?  Yes   Has the patient kept scheduled appointments due by today?  Yes   Has home health visited the patient within 72 hours of discharge?  N/A   Psychosocial issues?  No   Did the patient receive a copy of their discharge instructions?  Yes   Nursing interventions  Reviewed instructions with patient   What is the patient's perception of their health status since discharge?  Improving   Nursing interventions  Nurse provided patient education   Is the patient/caregiver able to teach back normal signs of recovery?  Pain or discomfort at incisional site   Nursing interventions  Reassured on normal signs of recovery   Is the patient /caregiver able to teach back basic post-op care?  Keep incision areas clean, dry and protected, No tub bath, swimming, or hot tub until instructed by MD, Take showers only when approved by MD-sponge bathe until then   Is the patient/caregiver able to teach back signs and symptoms of incisional infection?  Increased redness, swelling or pain at the incisonal site,  Increased drainage or bleeding, Fever, Incisional warmth, Pus or odor from incision   Is the patient/caregiver able to teach back steps to recovery at home?  Set small, achievable goals for return to baseline health, Make a list of questions for surgeon's appointment   Is the patient/caregiver able to teach back the hierarchy of who to call/visit for symptoms/problems? PCP, Specialist, Home health nurse, Urgent Care, ED, 911  Yes   Week 3 call completed?  Yes          Mason Reynoso RN

## 2021-04-15 ENCOUNTER — READMISSION MANAGEMENT (OUTPATIENT)
Dept: CALL CENTER | Facility: HOSPITAL | Age: 51
End: 2021-04-15

## 2021-04-15 NOTE — OUTREACH NOTE
CT Surgery Week 4 Survey      Responses   [facility] patient discharged from?  [place]   Does the patient have one of the following disease processes/diagnoses(primary or secondary)?  Cardiothoracic surgery   Week 4 attempt successful?  No          Toyin Pretty RN

## 2021-05-05 ENCOUNTER — OFFICE VISIT (OUTPATIENT)
Dept: CARDIAC SURGERY | Facility: CLINIC | Age: 51
End: 2021-05-05

## 2021-05-05 VITALS
BODY MASS INDEX: 28.17 KG/M2 | DIASTOLIC BLOOD PRESSURE: 91 MMHG | OXYGEN SATURATION: 100 % | TEMPERATURE: 97.3 F | HEIGHT: 72 IN | WEIGHT: 208 LBS | SYSTOLIC BLOOD PRESSURE: 150 MMHG | HEART RATE: 112 BPM

## 2021-05-05 DIAGNOSIS — C34.11 MALIGNANT NEOPLASM OF UPPER LOBE OF RIGHT LUNG (HCC): Primary | ICD-10-CM

## 2021-05-05 PROCEDURE — 99024 POSTOP FOLLOW-UP VISIT: CPT | Performed by: NURSE PRACTITIONER

## 2021-05-05 RX ORDER — GABAPENTIN 300 MG/1
300 CAPSULE ORAL 3 TIMES DAILY
COMMUNITY
End: 2022-03-08

## 2021-05-05 NOTE — PROGRESS NOTES
[facility] Cardiothoracic Surgery Office Follow Up Note     Date of Encounter: 05/05/2021     MRN Number: [id number]  Name: Birgit Ramirez  Phone Number: [phone]     Referred By: No ref. provider found  PCP: Jose Raines MD    Chief Complaint:    Chief Complaint   Patient presents with   • Post-op     3 week follow up for suture removal s/p right upper lobectomy 3/18/21.   • Lung Cancer       History of Present Illness:    Birgit Ramirez is a 51 y.o. female former smoker who is s/p P bronchoscopy with right VATS and right upper lobectomy with lymph node dissection by Dr. Cevallos on 3/18/2021 with pathology revealing invasive moderately differentiated adenocarcinoma, negative margins, negative nodes with staging of pT1b N0 MX stage IA2.  Last seen in office on 4/6/2021 with Jenny MAYES for her postoperative follow-up doing well with mild shortness of air, stable CXR, with RTC for suture removal.  She presents today for the suture removal, doing well and reports her shortness of air has improved.  She continues to have some postoperative neuropathic right lateral intercostal rib pain which her PCP prescribed gabapentin for.  She says this works very well and without it she could hardly wear a bra.  However she is interested in pain management injections because she does not like taking a pill every day.  She is interested in returning to work (lab ) but is concerned about in person risks.    Review of Systems:  Review of Systems   Constitutional: Negative. Negative for chills, decreased appetite, diaphoresis, fever, malaise/fatigue, night sweats, weight gain and weight loss.   HENT: Negative for hoarse voice.    Eyes: Negative.  Negative for blurred vision, double vision and visual disturbance.   Cardiovascular: Positive for dyspnea on exertion. Negative for chest pain, claudication, irregular heartbeat, leg swelling, near-syncope, orthopnea, palpitations, paroxysmal nocturnal  dyspnea and syncope.   Respiratory: Positive for cough. Negative for hemoptysis, shortness of breath, sputum production and wheezing.    Hematologic/Lymphatic: Negative for adenopathy and bleeding problem.   Skin: Negative.  Negative for color change, nail changes, poor wound healing and rash.   Musculoskeletal: Negative.  Negative for back pain, falls and muscle cramps.   Gastrointestinal: Negative.  Negative for abdominal pain, dysphagia and heartburn.   Genitourinary: Negative for flank pain.   Neurological: Positive for headaches and numbness. Negative for brief paralysis, disturbances in coordination, dizziness, focal weakness, light-headedness, loss of balance, paresthesias, sensory change, vertigo and weakness.   Psychiatric/Behavioral: Negative for depression and suicidal ideas.   Allergic/Immunologic: Negative for persistent infections.       I have reviewed the following portions of the patient's history: allergies, current medications, past family history, past medical history, past social history, past surgical history and problem list and confirm it's accurate.    Allergies:  No Known Allergies    Medications:      Current Outpatient Medications:   •  Cyanocobalamin (VITAMIN B-12 IJ), Inject  as directed Every 30 (Thirty) Days., Disp: , Rfl:   •  gabapentin (NEURONTIN) 300 MG capsule, Take 300 mg by mouth 3 (Three) Times a Day., Disp: , Rfl:   •  ibuprofen (ADVIL,MOTRIN) 600 MG tablet, As Needed., Disp: , Rfl:   •  HYDROcodone-acetaminophen (NORCO) 5-325 MG per tablet, Take 1 tablet by mouth Every 6 (Six) Hours As Needed for Moderate Pain ., Disp: 30 tablet, Rfl: 0    History:   Past Medical History:   Diagnosis Date   • Crohn's disease (CMS/HCC)    • Depression    • Lung cancer (CMS/HCC)    • Pneumonia        Past Surgical History:   Procedure Laterality Date   • BRONCHOSCOPY N/A 3/18/2021    Procedure: BRONCHOSCOPY;  Surgeon: Kvng Cevallos MD;  Location: [facility];  Service: Cardiothoracic;   "Laterality: N/A;   •  SECTION      x2   • CHOLECYSTECTOMY     • COLON SURGERY     • COLONOSCOPY     • LUNG BIOPSY     • LYMPH NODE DISSECTION Right 3/18/2021    Procedure: MEDIASTINAL LYMPH NODE DISSECTION;  Surgeon: Kvng Cevallos MD;  Location: [facility] OR;  Service: Cardiothoracic;  Laterality: Right;   • THORACOSCOPY VIDEO ASSISTED WITH LOBECTOMY Right 3/18/2021    Procedure: THORACOSCOPY VIDEO ASSISTED WITH RIGHT UPPER LOBECTOMY AND INTERCOSTAL CRYO ABLATION;  Surgeon: Kvng Cevallos MD;  Location:  [facility] OR;  Service: Cardiothoracic;  Laterality: Right;        Family History   Problem Relation Age of Onset   • Crohn's disease Mother    • Breast cancer Mother    • Diabetes Father    • Hypertension Father    • Hyperlipidemia Father    • Peripheral vascular disease Father        Social History     Socioeconomic History   • Marital status:      Spouse name: Not on file   • Number of children: 2   • Years of education: Not on file   • Highest education level: Not on file   Tobacco Use   • Smoking status: Former Smoker     Packs/day: 0.50     Years: 18.00     Pack years: 9.00     Types: Cigarettes, Electronic Cigarette     Quit date:      Years since quitting: 15.3   • Smokeless tobacco: Never Used   • Tobacco comment: quit cigarettes , vaped 10 years quit 2020   Vaping Use   • Vaping Use: Former   • Quit date: 2020   Substance and Sexual Activity   • Alcohol use: No   • Drug use: No   • Sexual activity: Defer     Physical Exam:  Vitals:    21 1000   BP: 150/91   BP Location: Right arm   Patient Position: Sitting   Pulse: 112   Temp: 97.3 °F (36.3 °C)   SpO2: 100%   Weight: 94.3 kg (208 lb)   Height: 182.9 cm (72\")      Body mass index is 28.21 kg/m².    Physical Exam  Vitals and nursing note reviewed.   Constitutional:       General: She is awake.      Appearance: Normal appearance.   HENT:      Head: Normocephalic and atraumatic.   Eyes:      Pupils: Pupils are equal, round, " and reactive to light.   Cardiovascular:      Rate and Rhythm: Normal rate and regular rhythm.      Pulses: Normal pulses.      Heart sounds: Normal heart sounds, S1 normal and S2 normal.   Pulmonary:      Effort: Pulmonary effort is normal.      Breath sounds: Normal breath sounds. No decreased air movement.   Abdominal:      Palpations: Abdomen is soft.   Musculoskeletal:         General: Normal range of motion.      Cervical back: Normal range of motion and neck supple.      Right lower leg: No edema.      Left lower leg: No edema.   Lymphadenopathy:      Cervical: No cervical adenopathy.      Right cervical: No superficial, deep or posterior cervical adenopathy.     Left cervical: No superficial, deep or posterior cervical adenopathy.      Upper Body:      Right upper body: No supraclavicular or axillary adenopathy.      Left upper body: No supraclavicular or axillary adenopathy.   Skin:     General: Skin is warm and dry.      Capillary Refill: Capillary refill takes less than 2 seconds.      Findings: No lesion.      Nails: There is no clubbing.      Comments: Thoracotomy incision: well healing with wound edges approximated, no surrounding erythema, edema, tenderness or induration on palpation. Sutures intact  Chest tube site: closed with no surround erythema, warmth, or tenderness    Neurological:      General: No focal deficit present.      Mental Status: She is alert and oriented to person, place, and time. Mental status is at baseline.      GCS: GCS eye subscore is 4. GCS verbal subscore is 5. GCS motor subscore is 6.      Cranial Nerves: Cranial nerves are intact.      Sensory: Sensation is intact.      Motor: Motor function is intact.      Coordination: Coordination is intact.      Gait: Gait is intact.   Psychiatric:         Mood and Affect: Mood and affect normal. Mood is not depressed.         Speech: Speech normal.         Behavior: Behavior normal. Behavior is cooperative.         Thought Content:  Thought content normal.         Judgment: Judgment normal.     Labs/Imaging:  XR Chest 2 View    Result Date: 4/6/2021  Redemonstrated postoperative changes without evidence of acute disease in the chest.  This report was finalized on 4/6/2021 12:00 PM by Octavio Vega.       Assessment / Plan:  Diagnoses and all orders for this visit:    1. Malignant neoplasm of upper lobe of right lung (CMS/HCC) (Primary)  -     CT Chest With & Without Contrast Diagnostic; Future    Birgit Ramirez is a 51 y.o. female former smoker who is s/p P bronchoscopy with right VATS and right upper lobectomy with lymph node dissection by Dr. Cevallos on 3/18/2021 with pathology revealing invasive moderately differentiated adenocarcinoma, negative margins, negative nodes with staging of pT1b N0 MX stage IA2.  She presents today for her suture removal.  Postoperative incisions well-healing with no signs of complication.  Sutures removed.  Exam benign.  We will see patient back in 6 months with CT for surveillance.  Patient is 6 weeks postoperatively as of first of this week and can return to working from home.  We will reeval return to work at follow-up.      Follow Up:   Return in about 6 months (around 11/5/2021).   Or sooner for any further concerns or worsening sign and symptoms. If unable to reach us in the office please dial 911 or go to the nearest emergency department.      Kair MAYES  [facility] Cardiothoracic Surgery

## 2021-07-07 DIAGNOSIS — Z12.11 ENCOUNTER FOR SCREENING COLONOSCOPY: Primary | ICD-10-CM

## 2021-09-13 ENCOUNTER — OUTSIDE FACILITY SERVICE (OUTPATIENT)
Dept: GASTROENTEROLOGY | Facility: CLINIC | Age: 51
End: 2021-09-13

## 2021-09-13 PROCEDURE — 45380 COLONOSCOPY AND BIOPSY: CPT | Performed by: INTERNAL MEDICINE

## 2021-09-13 PROCEDURE — 88305 TISSUE EXAM BY PATHOLOGIST: CPT | Performed by: INTERNAL MEDICINE

## 2021-09-14 ENCOUNTER — LAB REQUISITION (OUTPATIENT)
Dept: LAB | Facility: HOSPITAL | Age: 51
End: 2021-09-14

## 2021-09-14 DIAGNOSIS — K62.0 ANAL POLYP: ICD-10-CM

## 2021-09-14 DIAGNOSIS — K64.8 OTHER HEMORRHOIDS: ICD-10-CM

## 2021-09-14 DIAGNOSIS — Z12.11 ENCOUNTER FOR SCREENING FOR MALIGNANT NEOPLASM OF COLON: ICD-10-CM

## 2021-09-14 DIAGNOSIS — Z98.0 INTESTINAL BYPASS AND ANASTOMOSIS STATUS: ICD-10-CM

## 2021-09-14 DIAGNOSIS — Z87.19 PERSONAL HISTORY OF OTHER DISEASES OF THE DIGESTIVE SYSTEM: ICD-10-CM

## 2021-09-15 LAB
CYTO UR: NORMAL
LAB AP CASE REPORT: NORMAL
LAB AP CLINICAL INFORMATION: NORMAL
LAB AP DIAGNOSIS COMMENT: NORMAL
PATH REPORT.FINAL DX SPEC: NORMAL
PATH REPORT.GROSS SPEC: NORMAL

## 2021-12-13 ENCOUNTER — TELEPHONE (OUTPATIENT)
Dept: CARDIAC SURGERY | Facility: CLINIC | Age: 51
End: 2021-12-13

## 2021-12-13 NOTE — TELEPHONE ENCOUNTER
I have mailed Ms. Ramirez a letter asking her to call the office to update her insurance information so we can get the authorization for her CT scan that is needed for her follow-up appointment.

## 2022-02-04 ENCOUNTER — TELEPHONE (OUTPATIENT)
Dept: CARDIAC SURGERY | Facility: CLINIC | Age: 52
End: 2022-02-04

## 2022-02-04 NOTE — TELEPHONE ENCOUNTER
I called Ms Briones to see if she had her CT chest done for her appointment on 2/8/22. She stated that she did not get her CT done. She would like to get her scan done at the [facility]. I told her the doctors prefer to get scan done at [facility] so they had access to the images but I would let the schedulers know she would like to get it done there. She knows not to come to the appointment on 2/8/22.

## 2022-02-22 ENCOUNTER — TELEPHONE (OUTPATIENT)
Dept: CARDIAC SURGERY | Facility: CLINIC | Age: 52
End: 2022-02-22

## 2022-02-22 NOTE — TELEPHONE ENCOUNTER
Patient has called office back requesting to get her test done at [facility] clinic then f/u up with [facility]. pts ins has been added

## 2022-03-08 ENCOUNTER — OFFICE VISIT (OUTPATIENT)
Dept: CARDIAC SURGERY | Facility: CLINIC | Age: 52
End: 2022-03-08

## 2022-03-08 VITALS
OXYGEN SATURATION: 99 % | WEIGHT: 210 LBS | TEMPERATURE: 98.7 F | HEIGHT: 72 IN | DIASTOLIC BLOOD PRESSURE: 90 MMHG | BODY MASS INDEX: 28.44 KG/M2 | SYSTOLIC BLOOD PRESSURE: 160 MMHG | HEART RATE: 105 BPM

## 2022-03-08 DIAGNOSIS — C34.11 MALIGNANT NEOPLASM OF UPPER LOBE OF RIGHT LUNG: Primary | ICD-10-CM

## 2022-03-08 DIAGNOSIS — Z00.6 EXAMINATION FOR NORMAL COMPARISON FOR CLINICAL RESEARCH: Primary | ICD-10-CM

## 2022-03-08 PROBLEM — K50.90 CROHN DISEASE: Status: ACTIVE | Noted: 2022-03-08

## 2022-03-08 PROCEDURE — 99213 OFFICE O/P EST LOW 20 MIN: CPT | Performed by: NURSE PRACTITIONER

## 2022-03-08 NOTE — PROGRESS NOTES
[facility] Cardiothoracic Surgery Office Follow Up Note     Date of Encounter: 2022     Name: Birgit Ramirez  : 1970     Referred By: No ref. provider found  PCP: Jose Raines MD    Chief Complaint:    Chief Complaint   Patient presents with   • Follow-up     6 MO FU with CT Chest for Right Lung Cancer       Subjective      History of Present Illness:    Birgit Ramirez is a very pleasant 52 y.o. female former smoker with history of Crohn's disease and stage IA2 invasive moderately differentiated adenocarcinoma s/p bronc, VATS with right upper lobectomy and lymph node dissection on 3/18/2021 by Dr. Cevallos (pT1b N0 MX).  Last seen in clinic 2021 complaining of typical postoperative neuropathic pain along the right lateral chest wall being treated with gabapentin.  She was scheduled for 6-month imaging per NCCN guidelines which would have been due back in November but did not have this performed until recently. She has no acute complaints today. She denies any respiratory symptoms including SOA, DOA, chronic cough or hemoptysis. She has good appetite with no weight changes. She does still have some chest wall sensitivity at her bra line/ most anterior VATs site but is no longer taking gabapentin for this.     Review of Systems:  Review of Systems   Constitutional: Negative for chills, decreased appetite, diaphoresis, fever, malaise/fatigue, night sweats, weight gain and weight loss.   HENT: Negative for hoarse voice.    Eyes: Negative for blurred vision, double vision and visual disturbance.   Cardiovascular: Positive for leg swelling. Negative for chest pain, claudication, dyspnea on exertion, irregular heartbeat, near-syncope, orthopnea, palpitations, paroxysmal nocturnal dyspnea and syncope.   Respiratory: Negative for cough, hemoptysis, shortness of breath, sputum production and wheezing.    Hematologic/Lymphatic: Negative for adenopathy and bleeding problem. Does not bruise/bleed easily.    Skin: Negative for color change, nail changes, poor wound healing and rash.   Musculoskeletal: Negative for back pain, falls and muscle cramps.   Gastrointestinal: Positive for dysphagia. Negative for abdominal pain and heartburn.   Genitourinary: Negative for flank pain.   Neurological: Negative for brief paralysis, disturbances in coordination, dizziness, focal weakness, headaches, light-headedness, loss of balance, numbness, paresthesias, sensory change, vertigo and weakness.   Psychiatric/Behavioral: Negative for depression and suicidal ideas.   Allergic/Immunologic: Negative for persistent infections.       I have reviewed the following portions of the patient's history: allergies, current medications, past family history, past medical history, past social history, past surgical history and problem list and confirm it's accurate.    Allergies:  No Known Allergies    Medications:    No current outpatient medications on file.    History:   Past Medical History:   Diagnosis Date   • Crohn's disease (HCC)    • Depression    • Lung cancer (HCC)    • Pneumonia        Past Surgical History:   Procedure Laterality Date   • BRONCHOSCOPY N/A 3/18/2021    Procedure: BRONCHOSCOPY;  Surgeon: Kvng Cevallos MD;  Location: [facility] OR;  Service: Cardiothoracic;  Laterality: N/A;   •  SECTION      x2   • CHOLECYSTECTOMY     • COLON SURGERY     • COLONOSCOPY     • LUNG BIOPSY     • LYMPH NODE DISSECTION Right 3/18/2021    Procedure: MEDIASTINAL LYMPH NODE DISSECTION;  Surgeon: Kvng Cevallos MD;  Location: [facility] OR;  Service: Cardiothoracic;  Laterality: Right;   • THORACOSCOPY VIDEO ASSISTED WITH LOBECTOMY Right 3/18/2021    Procedure: THORACOSCOPY VIDEO ASSISTED WITH RIGHT UPPER LOBECTOMY AND INTERCOSTAL CRYO ABLATION;  Surgeon: Kvng Cevallos MD;  Location: [facility] OR;  Service: Cardiothoracic;  Laterality: Right;       Social History     Socioeconomic History   • Marital status:    • Number of children: 2  "  Tobacco Use   • Smoking status: Former Smoker     Packs/day: 0.50     Years: 18.00     Pack years: 9.00     Types: Cigarettes, Electronic Cigarette     Quit date:      Years since quittin.1   • Smokeless tobacco: Never Used   • Tobacco comment: quit cigarettes , vaped 10 years quit 2020   Vaping Use   • Vaping Use: Former   • Quit date: 2020   Substance and Sexual Activity   • Alcohol use: No   • Drug use: No   • Sexual activity: Defer        Family History   Problem Relation Age of Onset   • Crohn's disease Mother    • Breast cancer Mother    • Diabetes Father    • Hypertension Father    • Hyperlipidemia Father    • Peripheral vascular disease Father        Objective   Physical Exam:  Vitals:    22 1452   BP: 160/90   BP Location: Left arm   Patient Position: Sitting   Pulse: 105   Temp: 98.7 °F (37.1 °C)   SpO2: 99%   Weight: 95.3 kg (210 lb)   Height: 182.9 cm (72\")      Body mass index is 28.48 kg/m².    Physical Exam  Vitals and nursing note reviewed.   Constitutional:       General: She is awake.      Appearance: Normal appearance.   HENT:      Head: Normocephalic and atraumatic.   Eyes:      Pupils: Pupils are equal, round, and reactive to light.   Cardiovascular:      Rate and Rhythm: Normal rate and regular rhythm.      Pulses: Normal pulses.      Heart sounds: Normal heart sounds, S1 normal and S2 normal.   Pulmonary:      Effort: Pulmonary effort is normal.      Breath sounds: Normal breath sounds. No decreased air movement.      Comments: Clear equal throughout  Chest:   Breasts:      Right: No axillary adenopathy or supraclavicular adenopathy.      Left: No axillary adenopathy or supraclavicular adenopathy.       Abdominal:      Palpations: Abdomen is soft.   Musculoskeletal:         General: Normal range of motion.      Cervical back: Normal range of motion and neck supple.      Right lower leg: No edema.      Left lower leg: No edema.   Lymphadenopathy:      Cervical: No " cervical adenopathy.      Right cervical: No superficial, deep or posterior cervical adenopathy.     Left cervical: No superficial, deep or posterior cervical adenopathy.      Upper Body:      Right upper body: No supraclavicular or axillary adenopathy.      Left upper body: No supraclavicular or axillary adenopathy.   Skin:     General: Skin is warm and dry.      Capillary Refill: Capillary refill takes less than 2 seconds.      Findings: No lesion.      Nails: There is no clubbing.      Comments: Thoracotomy scar without complication   Neurological:      General: No focal deficit present.      Mental Status: She is alert and oriented to person, place, and time. Mental status is at baseline.      GCS: GCS eye subscore is 4. GCS verbal subscore is 5. GCS motor subscore is 6.      Cranial Nerves: Cranial nerves are intact.      Sensory: Sensation is intact.      Motor: Motor function is intact.      Coordination: Coordination is intact.      Gait: Gait is intact.   Psychiatric:         Mood and Affect: Mood and affect normal. Mood is not depressed.         Speech: Speech normal.         Behavior: Behavior normal. Behavior is cooperative.         Thought Content: Thought content normal.         Judgment: Judgment normal.         Imaging/Labs:  CT chest with and without contrast ([facility])-3/1/2022  Right upper lobectomy.  No acute thoracic process is seen.     NM PET Skull Base to Mid Thigh-3/2/2021  Soft tissue nodular focus in the right lung apex posteriorly appears potentially more prominent and solid component in the inferior portion although measures unchanged from 01/04/2021 comparison with low-level/equivocal hypermetabolism associated, maximum SUV 2.5, but greater than blood pool activity in the aorta concerning for potential malignancy. No evidence for abnormal hypermetabolism outside of this area is noted on the current exam.    CT Needle Biopsy Lung-2/8/2021  CT-guided biopsy of 1.6 cm  cavitary right upper lobe lung nodule.    Assessment / Plan      Assessment / Plan:  Diagnoses and all orders for this visit:    1. Malignant neoplasm of upper lobe of right lung (HCC) (Primary)  -     CT Chest With & Without Contrast Diagnostic; Future       · Former smoker with history of stage IA2 invasive moderately differentiated adenocarcinoma s/p [facility], VATS with right upper lobectomy and lymph node dissection on 3/18/2021 by Dr. Cevallos (pT1b N0 MX).    · Scheduled for 6-month imaging per NCCN guidelines which would have been due back in November but did not have this performed until recently.   · No acute complaints today with no respiratory symptoms or constitutional symptoms  · Continues to have mild chest wall sensitivity at her bra line/ most anterior VATs site but is no longer taking gabapentin for this  · Exam benign   · Surveillance CT imaging personally reviewed with no development of new nodularities or densities   · Continue active surveillance per NCCN guidelines with repeat scan in 6 months    Follow Up:   Return in about 6 months (around 9/8/2022) for Imaging next visit.   Or sooner for any further concerns or worsening sign and symptoms. If unable to reach us in the office please dial 911 or go to the nearest emergency department.      Kari MAYES  [facility] Cardiothoracic Surgery    Time Spent: I spent 22 minutes caring for Birgit on this date of service. This time includes time spent by me in the following activities: preparing for the visit, reviewing tests, obtaining and/or reviewing a separately obtained history, performing a medically appropriate examination and/or evaluation, counseling and educating the patient/family/caregiver, ordering medications, tests, or procedures, documenting information in the medical record, independently interpreting results and communicating that information with the patient/family/caregiver and care coordination.

## 2022-11-03 ENCOUNTER — TELEPHONE (OUTPATIENT)
Dept: CARDIAC SURGERY | Facility: CLINIC | Age: 52
End: 2022-11-03

## 2022-11-03 NOTE — TELEPHONE ENCOUNTER
Caller: Birgit Ramirez    Relationship: Self    Best call back number:[phone]    What orders are you requesting (i.e. lab or imaging): CT CHEST W W/O CON    In what timeframe would the patient need to come in:ASAP    Where will you receive your lab/imaging services: [facility]    Additional notes: PT CALLED IN SAYING SHE CALLED TO SCHEDULE HER CT AT [facility] & THEY STATED SOMEONE FROM THE OFFICE NEEDED TO CALL TO GET THAT SCHEDULED & THEY NEEDED A PRE AUTHORIZED ORDER.     PLEASE CONTACT PT IF ADDITIONAL DETAILS ARE NEEDED- THANKS!

## 2023-01-10 ENCOUNTER — OFFICE VISIT (OUTPATIENT)
Dept: CARDIAC SURGERY | Facility: CLINIC | Age: 53
End: 2023-01-10
Payer: COMMERCIAL

## 2023-01-10 VITALS
HEIGHT: 72 IN | TEMPERATURE: 98.2 F | OXYGEN SATURATION: 98 % | SYSTOLIC BLOOD PRESSURE: 140 MMHG | BODY MASS INDEX: 28.77 KG/M2 | DIASTOLIC BLOOD PRESSURE: 88 MMHG | WEIGHT: 212.4 LBS | HEART RATE: 104 BPM

## 2023-01-10 DIAGNOSIS — C34.11 MALIGNANT NEOPLASM OF UPPER LOBE OF RIGHT LUNG: Primary | ICD-10-CM

## 2023-01-10 PROCEDURE — 99213 OFFICE O/P EST LOW 20 MIN: CPT | Performed by: NURSE PRACTITIONER

## 2023-01-10 NOTE — PROGRESS NOTES
[facility] Cardiothoracic Surgery Office Follow Up Note     Date of Encounter: 01/10/2023     Name: Birgit Ramirez  : 1970     Referred By: No ref. provider found  PCP: Jose Raines MD    Chief Complaint:    Chief Complaint   Patient presents with   • Follow-up     Patient presents for 6 month follow up - lung cancer.  CT chest .  She states she has been feeling well recently, but did have flu when she had her CT scan.         Subjective      History of Present Illness:    Birgit Ramirez is a 52 y.o. female former smoker with history of Crohn's disease and stage IA2 invasive moderately differentiated adenocarcinoma s/p bronc, VATS with right upper lobectomy and lymph node dissection on 3/18/2021 by Dr. Cevallos (pT1b N0 MX).  Last seen in clinic 3/8/22 doing well. She was scheduled for 6-month imaging per NCCN guidelines.  Chest CT performed 22.   Patient states she had \"the flu\" at the time of the CT.  She has no acute complaints today. She denies any respiratory symptoms including SOA, KELSEY, chronic cough or hemoptysis. She has good appetite with no weight changes.      Review of Systems:  Review of Systems   Constitutional: Negative. Negative for chills, decreased appetite, diaphoresis, fever, malaise/fatigue, night sweats, weight gain and weight loss.   HENT: Negative.  Negative for hoarse voice.    Eyes: Negative for blurred vision, double vision and visual disturbance.   Cardiovascular: Negative for chest pain, claudication, dyspnea on exertion, irregular heartbeat, leg swelling (bilateral minimal ), near-syncope, orthopnea, palpitations, paroxysmal nocturnal dyspnea and syncope.   Respiratory: Negative.  Negative for cough, hemoptysis, shortness of breath, sputum production and wheezing.    Endocrine: Negative.    Hematologic/Lymphatic: Negative.  Negative for adenopathy and bleeding problem. Does not bruise/bleed easily.   Skin: Negative.  Negative for color change, nail changes,  poor wound healing and rash.   Musculoskeletal: Positive for arthritis (bilateral knee pain ). Negative for back pain, falls and muscle cramps.   Gastrointestinal: Negative.  Negative for abdominal pain, dysphagia and heartburn.   Genitourinary: Negative.  Negative for flank pain.   Neurological: Negative.  Negative for brief paralysis, disturbances in coordination, dizziness, focal weakness, headaches, light-headedness, loss of balance, numbness, paresthesias, sensory change, vertigo and weakness.   Psychiatric/Behavioral: Negative.  Negative for depression and suicidal ideas.   Allergic/Immunologic: Negative.  Negative for persistent infections.       I have reviewed the following portions of the patient's history: allergies, current medications, past medical history, past social history, past surgical history, problem list and ROS and confirm it's accurate.    Allergies:  No Known Allergies    Medications:    No current outpatient medications on file.    History:   Past Medical History:   Diagnosis Date   • Crohn's disease (HCC)    • Depression    • Lung cancer (HCC)    • Pneumonia 2022       Past Surgical History:   Procedure Laterality Date   • BRONCHOSCOPY N/A 3/18/2021    Procedure: BRONCHOSCOPY;  Surgeon: Kvng Cevallos MD;  Location: [facility] OR;  Service: Cardiothoracic;  Laterality: N/A;   •  SECTION      x2   • CHOLECYSTECTOMY     • COLON SURGERY     • COLONOSCOPY     • LUNG BIOPSY     • LYMPH NODE DISSECTION Right 3/18/2021    Procedure: MEDIASTINAL LYMPH NODE DISSECTION;  Surgeon: Kvng Cevallos MD;  Location: [facility] OR;  Service: Cardiothoracic;  Laterality: Right;   • THORACOSCOPY VIDEO ASSISTED WITH LOBECTOMY Right 3/18/2021    Procedure: THORACOSCOPY VIDEO ASSISTED WITH RIGHT UPPER LOBECTOMY AND INTERCOSTAL CRYO ABLATION;  Surgeon: Kvng Cevallos MD;  Location: [facility] OR;  Service: Cardiothoracic;  Laterality: Right;       Social History     Socioeconomic History   • Marital status:     • Number of children: 2   Tobacco Use   • Smoking status: Former     Packs/day: 0.50     Years: 18.00     Pack years: 9.00     Types: Cigarettes, Electronic Cigarette     Quit date:      Years since quittin.0   • Smokeless tobacco: Never   • Tobacco comments:     quit cigarettes , vaped 10 years quit 2020   Vaping Use   • Vaping Use: Former   • Quit date: 2020   Substance and Sexual Activity   • Alcohol use: No   • Drug use: No   • Sexual activity: Defer        Family History   Problem Relation Age of Onset   • Crohn's disease Mother    • Breast cancer Mother    • Diabetes Father    • Hypertension Father    • Hyperlipidemia Father    • Peripheral vascular disease Father    • Heart failure Father        Objective   Physical Exam:  Vitals:    01/10/23 1244 01/10/23 1245   BP: 132/84 140/88   BP Location: Left arm Right arm   Patient Position: Sitting Sitting   Pulse: 104    Temp: 98.2 °F (36.8 °C)    SpO2: 98%    Weight: 96.3 kg (212 lb 6.4 oz)    Height: 182.9 cm (72.01\")       Body mass index is 28.8 kg/m².    Physical Exam  Vitals reviewed.   Constitutional:       General: She is not in acute distress.     Appearance: She is not toxic-appearing.   HENT:      Head: Normocephalic and atraumatic.   Eyes:      Conjunctiva/sclera: Conjunctivae normal.      Pupils: Pupils are equal, round, and reactive to light.   Cardiovascular:      Rate and Rhythm: Regular rhythm. Tachycardia present.      Heart sounds: No murmur heard.  Pulmonary:      Effort: Pulmonary effort is normal.      Breath sounds: Normal breath sounds.      Comments: Well healed right chest wall VATS sites  Musculoskeletal:         General: Normal range of motion.      Cervical back: Normal range of motion and neck supple.   Skin:     General: Skin is warm and dry.      Capillary Refill: Capillary refill takes less than 2 seconds.   Neurological:      General: No focal deficit present.      Mental Status: She is alert and  oriented to person, place, and time.   Psychiatric:         Mood and Affect: Mood normal.         Behavior: Behavior normal.       Imaging/Labs:  CT outside films (11/22/2022 12:13)  EXTERNAL MEDICAL RECORDS - SCAN - CHEST CT LDC 11/18/22 (11/21/2022)  CT chest 11/18/2022 personally reviewed: S/p right upper lobectomy.  Right middle and lower lobe consolidations and groundglass opacities and peribronchial and vascular distribution consistent with pneumonia.  Recommend follow-up CT to ensure resolution and exclude underlying evidence of recurrent malignancy.      CT chest with and without contrast ([facility])-3/1/2022  Right upper lobectomy.  No acute thoracic process is seen.     NM PET Skull Base to Mid Thigh-3/2/2021  Soft tissue nodular focus in the right lung apex posteriorly appears potentially more prominent and solid component in the inferior portion although measures unchanged from 01/04/2021 comparison with low-level/equivocal hypermetabolism associated, maximum SUV 2.5, but greater than blood pool activity in the aorta concerning for potential malignancy. No evidence for abnormal hypermetabolism outside of this area is noted on the current exam.     CT Needle Biopsy Lung-2/8/2021  CT-guided biopsy of 1.6 cm cavitary right upper lobe lung nodule.  Assessment / Plan      Assessment / Plan:  1.  Malignant neoplasm right upper lobe s/p resection  - Former smoker with history of Crohn's disease and stage IA2 invasive moderately differentiated adenocarcinoma s/p [facility], VATS with right upper lobectomy and lymph node dissection on 3/18/2021 by Dr. Cevallos (pT1b N0 MX).  -Most recent follow-up CT chest notable for active pneumonia changes.  Unable to assess for underlying malignancy recurrence.  Will need repeat study now that pneumonia symptoms have cleared.  -Reschedule follow-up clinic visit in approximately 3 to 4 weeks      Follow Up:   Return in about 4 weeks (around 2/7/2023) for w/ repeat  CT chest.   Or sooner for any further concerns or worsening sign and symptoms. If unable to reach us in the office please dial 911 or go to the nearest emergency department.      GERBER Nayak  [facility] Cardiothoracic Surgery    Time Spent: I spent 27 minutes caring for Birgit on this date of service. This time includes time spent by me in the following activities: preparing for the visit, reviewing tests, obtaining and/or reviewing a separately obtained history, performing a medically appropriate examination and/or evaluation, counseling and educating the patient/family/caregiver, ordering medications, tests, or procedures, documenting information in the medical record, independently interpreting results and communicating that information with the patient/family/caregiver and care coordination.

## 2023-02-14 ENCOUNTER — OFFICE VISIT (OUTPATIENT)
Dept: CARDIAC SURGERY | Facility: CLINIC | Age: 53
End: 2023-02-14
Payer: COMMERCIAL

## 2023-02-14 VITALS
OXYGEN SATURATION: 98 % | BODY MASS INDEX: 28.34 KG/M2 | SYSTOLIC BLOOD PRESSURE: 140 MMHG | WEIGHT: 209 LBS | DIASTOLIC BLOOD PRESSURE: 90 MMHG | TEMPERATURE: 99.1 F | HEART RATE: 85 BPM

## 2023-02-14 DIAGNOSIS — C34.11 MALIGNANT NEOPLASM OF UPPER LOBE OF RIGHT LUNG: Primary | ICD-10-CM

## 2023-02-14 PROCEDURE — 99214 OFFICE O/P EST MOD 30 MIN: CPT | Performed by: NURSE PRACTITIONER

## 2023-02-14 NOTE — PROGRESS NOTES
"     [facility] Cardiothoracic Surgery Office Follow Up Note     Date of Encounter: 2023     Name: Birgit Ramirez  : 1970     Referred By: No ref. provider found  PCP: Jose Raines MD    Chief Complaint:    Chief Complaint   Patient presents with   • Follow-up     4 WK FU with CT Chest for Lung Cancer       Subjective      History of Present Illness:    Birgit Ramirez is a 52 y.o. female  former smoker with history of Crohn's disease and stage IA2 invasive moderately differentiated adenocarcinoma s/p bronc, VATS with right upper lobectomy and lymph node dissection on 3/18/2021 by Dr. Cevallos (pT1b N0 MX).  Last seen in clinic 1/10/22 to review 6-month CT imaging per NCCN guidelines.  Chest CT performed 22.   Patient states she had \"the flu\" at the time of the CT.  The study demonstreated active pneumonia changes and thus was insufficient for monitoring any underlying malignancy recurrence.  A repeat study was obtained 23.  She has no acute complaints today. She denies any respiratory symptoms including SOA, KELSEY, chronic cough or hemoptysis. She has good appetite with no weight changes.      Review of Systems:  Review of Systems   Constitutional: Negative for chills, decreased appetite, diaphoresis, fever, malaise/fatigue, night sweats, weight gain and weight loss.   HENT: Negative for hoarse voice.    Eyes: Negative for blurred vision, double vision and visual disturbance.   Cardiovascular: Positive for leg swelling. Negative for chest pain, claudication, dyspnea on exertion, irregular heartbeat, near-syncope, orthopnea, palpitations, paroxysmal nocturnal dyspnea and syncope.   Respiratory: Negative for cough, hemoptysis, shortness of breath, sputum production and wheezing.    Hematologic/Lymphatic: Negative for adenopathy and bleeding problem. Does not bruise/bleed easily.   Skin: Negative for color change, nail changes, poor wound healing and rash.   Musculoskeletal: Positive for " arthritis. Negative for back pain, falls and muscle cramps.   Gastrointestinal: Negative for abdominal pain, dysphagia and heartburn.   Genitourinary: Negative for flank pain.   Neurological: Negative for brief paralysis, disturbances in coordination, dizziness, focal weakness, headaches, light-headedness, loss of balance, numbness, paresthesias, sensory change, vertigo and weakness.   Psychiatric/Behavioral: Negative for depression and suicidal ideas.   Allergic/Immunologic: Negative for persistent infections.       I have reviewed the following portions of the patient's history: allergies, current medications, past family history, past medical history, past social history, past surgical history, problem list and ROS and confirm it's accurate.    Allergies:  No Known Allergies    Medications:    No current outpatient medications on file.    History:   Past Medical History:   Diagnosis Date   • Crohn's disease (HCC)    • Depression    • Lung cancer (HCC)    • Pneumonia 2022       Past Surgical History:   Procedure Laterality Date   • BRONCHOSCOPY N/A 3/18/2021    Procedure: BRONCHOSCOPY;  Surgeon: Kvng Cevallos MD;  Location: [facility] OR;  Service: Cardiothoracic;  Laterality: N/A;   •  SECTION      x2   • CHOLECYSTECTOMY     • COLON SURGERY     • COLONOSCOPY     • LUNG BIOPSY     • LYMPH NODE DISSECTION Right 3/18/2021    Procedure: MEDIASTINAL LYMPH NODE DISSECTION;  Surgeon: Kvng Cevallos MD;  Location: [facility] OR;  Service: Cardiothoracic;  Laterality: Right;   • THORACOSCOPY VIDEO ASSISTED WITH LOBECTOMY Right 3/18/2021    Procedure: THORACOSCOPY VIDEO ASSISTED WITH RIGHT UPPER LOBECTOMY AND INTERCOSTAL CRYO ABLATION;  Surgeon: Kvng Cevallos MD;  Location:  [facility] OR;  Service: Cardiothoracic;  Laterality: Right;       Social History     Socioeconomic History   • Marital status:    • Number of children: 2   Tobacco Use   • Smoking status: Former     Packs/day: 0.50     Years: 18.00     Pack  years: 9.00     Types: Cigarettes, Electronic Cigarette     Quit date:      Years since quittin.1   • Smokeless tobacco: Never   • Tobacco comments:     quit cigarettes , vaped 10 years quit 2020   Vaping Use   • Vaping Use: Former   • Quit date: 2020   Substance and Sexual Activity   • Alcohol use: No   • Drug use: No   • Sexual activity: Defer        Family History   Problem Relation Age of Onset   • Crohn's disease Mother    • Breast cancer Mother    • Diabetes Father    • Hypertension Father    • Hyperlipidemia Father    • Peripheral vascular disease Father    • Heart failure Father        Objective   Physical Exam:  Vitals:    23 1004   BP: 140/90   BP Location: Left arm   Patient Position: Sitting   Pulse: 85   Temp: 99.1 °F (37.3 °C)   SpO2: 98%   Weight: 94.8 kg (209 lb)      Body mass index is 28.34 kg/m².    Physical Exam  Vitals reviewed.   Constitutional:       General: She is not in acute distress.     Appearance: She is well-developed and well-groomed. She is not toxic-appearing.   HENT:      Head: Normocephalic and atraumatic.   Eyes:      General: Lids are normal.      Conjunctiva/sclera: Conjunctivae normal.      Pupils: Pupils are equal, round, and reactive to light.   Cardiovascular:      Rate and Rhythm: Normal rate and regular rhythm.      Heart sounds: S1 normal and S2 normal. No murmur heard.  Pulmonary:      Effort: Pulmonary effort is normal.      Breath sounds: Normal breath sounds. No wheezing, rhonchi or rales.   Musculoskeletal:         General: Normal range of motion.      Cervical back: Normal range of motion and neck supple.      Right lower leg: No edema.      Left lower leg: No edema.   Lymphadenopathy:      Cervical: No cervical adenopathy.      Upper Body:      Right upper body: No supraclavicular adenopathy.      Left upper body: No supraclavicular adenopathy.   Skin:     General: Skin is warm and dry.      Capillary Refill: Capillary refill takes less  than 2 seconds.   Neurological:      General: No focal deficit present.      Mental Status: She is alert and oriented to person, place, and time.   Psychiatric:         Mood and Affect: Mood normal.         Behavior: Behavior normal. Behavior is cooperative.         Imaging/Labs:  CT chest with and without contrast 2/17/2023: Personally reviewed.  Agree with radiologist assessment.  Impression:  1.  Status post right upper lobectomy.  2.  Interval clearing of the previously noted airspace consolidations and groundglass opacities.  3.  Stable pleural-based nodular opacity at the right lung base.  Close attention to this area on follow-up imaging is recommended.  4.  Moderate hiatal hernia  Assessment / Plan      Assessment / Plan:  1. Malignant neoplasm of upper lobe of right lung (HCC) (Primary)  - 52 y.o. female  former smoker with history of Crohn's disease and stage IA2 invasive moderately differentiated adenocarcinoma s/p bronc, VATS with right upper lobectomy and lymph node dissection on 3/18/2021 by Dr. Cevallos (pT1b N0 MX).    - Last seen in clinic 1/10/22 to review 6-month CT imaging per NCCN guidelines.  - Chest CT performed 11/18/22 demonstreated active pneumonia changes and thus was insufficient for monitoring any underlying malignancy recurrence.    - Repeat study was obtained 2/7/23.    -Interval clearing of previously noted pneumonia changes.  Stable pleural-based nodular opacity right lung base  - No acute complaints today. She denies any respiratory symptoms including SOA, KELSEY, chronic cough or hemoptysis. She has good appetite with no weight changes.    -Per NCCN guidelines we will plan follow-up CT chest with and without for surveillance in 6 months.    Follow Up:   Return in about 6 months (around 8/14/2023) for Lung cancer surveillance CT chest with and without.   Or sooner for any further concerns or worsening sign and symptoms. If unable to reach us in the office please dial 911 or go to the  nearest emergency department.      GERBER Nayak  [facility] Cardiothoracic Surgery    Time Spent: I spent 34 minutes caring for Birgit on this date of service. This time includes time spent by me in the following activities: preparing for the visit, reviewing tests, obtaining and/or reviewing a separately obtained history, performing a medically appropriate examination and/or evaluation, counseling and educating the patient/family/caregiver, ordering medications, tests, or procedures, documenting information in the medical record, independently interpreting results and communicating that information with the patient/family/caregiver and care coordination.

## 2023-08-22 ENCOUNTER — OFFICE VISIT (OUTPATIENT)
Dept: CARDIAC SURGERY | Facility: CLINIC | Age: 53
End: 2023-08-22
Payer: COMMERCIAL

## 2023-08-22 VITALS
DIASTOLIC BLOOD PRESSURE: 84 MMHG | WEIGHT: 215 LBS | OXYGEN SATURATION: 99 % | HEART RATE: 95 BPM | SYSTOLIC BLOOD PRESSURE: 128 MMHG | BODY MASS INDEX: 29.12 KG/M2 | TEMPERATURE: 98.6 F | HEIGHT: 72 IN

## 2023-08-22 DIAGNOSIS — C34.11 MALIGNANT NEOPLASM OF UPPER LOBE OF RIGHT LUNG: Primary | ICD-10-CM

## 2023-08-22 PROCEDURE — 99214 OFFICE O/P EST MOD 30 MIN: CPT | Performed by: NURSE PRACTITIONER

## 2023-08-22 NOTE — PROGRESS NOTES
[facility] Cardiothoracic Surgery Office Follow Up Note     Date of Encounter: 2023     Name: Birgit Manuel  : 1970     Referred By: No ref. provider found  PCP: Joes Raines MD    Chief Complaint:    Chief Complaint   Patient presents with    Lung Cancer     6 month follow up for lung cancer.       Subjective      History of Present Illness:    Birgit Manuel is a 53 y.o. female former smoker with history of Crohn's disease and stage IA2 invasive moderately differentiated adenocarcinoma s/p bronc/ VATS with right upper lobectomy and lymph node dissection on 3/18/2021 by Dr. Cevallos (pT1b N0 MX). Last seen in clinic 23 to review 6-month CT imaging per NCCN guidelines.  Chest CT performed 22 during a respiratory illness demonstrated active pneumonia changes and thus was insufficient for monitoring any underlying malignancy recurrence.  A repeat study was obtained 23 was stable without evidence of malignancy recurrence.  A stable right pleural based nodule to be followed on subsequent exam.  Ms. Ramirez has no acute complaints today. She denies any respiratory symptoms including SOA, KELSEY, chronic cough, hemoptysis, lymphadenopathy, or  weight changes.       Review of Systems:  Review of Systems   Constitutional: Negative.   Eyes: Negative.    Cardiovascular:  Positive for palpitations.   Respiratory: Negative.     Endocrine: Negative.    Hematologic/Lymphatic: Negative.    Skin: Negative.    Musculoskeletal: Negative.    Gastrointestinal: Negative.    Genitourinary: Negative.    Neurological:  Positive for headaches.   Psychiatric/Behavioral: Negative.     Allergic/Immunologic: Negative.      I have reviewed the following portions of the patient's history: problem list, current medications, allergies, past surgical history, past medical history, past social history, past family history, and ROS and confirm it's accurate.    Allergies:  No Known  Allergies    Medications:    No current outpatient medications on file.    History:   Past Medical History:   Diagnosis Date    Crohn's disease     Depression     Lung cancer     Pneumonia 2022       Past Surgical History:   Procedure Laterality Date    BRONCHOSCOPY N/A 3/18/2021    Procedure: BRONCHOSCOPY;  Surgeon: Kvng Cevallos MD;  Location: [facility] OR;  Service: Cardiothoracic;  Laterality: N/A;     SECTION      x2    CHOLECYSTECTOMY      COLON SURGERY      COLONOSCOPY      LUNG BIOPSY      LYMPH NODE DISSECTION Right 3/18/2021    Procedure: MEDIASTINAL LYMPH NODE DISSECTION;  Surgeon: Kvgn Cevallos MD;  Location:  [facility] OR;  Service: Cardiothoracic;  Laterality: Right;    THORACOSCOPY VIDEO ASSISTED WITH LOBECTOMY Right 3/18/2021    Procedure: THORACOSCOPY VIDEO ASSISTED WITH RIGHT UPPER LOBECTOMY AND INTERCOSTAL CRYO ABLATION;  Surgeon: Kvng Cevallos MD;  Location: [facility] OR;  Service: Cardiothoracic;  Laterality: Right;       Social History     Socioeconomic History    Marital status:     Number of children: 2   Tobacco Use    Smoking status: Former     Packs/day: 0.50     Years: 18.00     Pack years: 9.00     Types: Cigarettes, Electronic Cigarette     Quit date:      Years since quittin.6    Smokeless tobacco: Never    Tobacco comments:     quit cigarettes , vaped 10 years quit 2020   Vaping Use    Vaping Use: Former    Quit date: 2020   Substance and Sexual Activity    Alcohol use: No    Drug use: No    Sexual activity: Defer        Family History   Problem Relation Age of Onset    Crohn's disease Mother     Breast cancer Mother     Diabetes Father     Hypertension Father     Hyperlipidemia Father     Peripheral vascular disease Father     Heart failure Father        Objective   Physical Exam:  Vitals:    23 1341   BP: 128/84   BP Location: Right arm   Patient Position: Sitting   Pulse: 95   Temp: 98.6 øF (37 øC)   SpO2: 99%   Weight: 97.5 kg (215 lb)  "  Height: 182.9 cm (72\")  Comment: patient reports      Body mass index is 29.16 kg/mý.    Physical Exam  Vitals reviewed.   Constitutional:       General: She is not in acute distress.  HENT:      Head: Normocephalic and atraumatic.   Eyes:      General: Lids are normal.      Conjunctiva/sclera: Conjunctivae normal.      Pupils: Pupils are equal, round, and reactive to light.   Cardiovascular:      Rate and Rhythm: Normal rate and regular rhythm.      Heart sounds: S1 normal and S2 normal. No murmur heard.  Pulmonary:      Effort: Pulmonary effort is normal. No respiratory distress.      Breath sounds: Normal breath sounds.   Musculoskeletal:         General: Normal range of motion.      Cervical back: Normal range of motion and neck supple.   Lymphadenopathy:      Cervical: No cervical adenopathy.      Upper Body:      Right upper body: No supraclavicular or axillary adenopathy.      Left upper body: No supraclavicular or axillary adenopathy.   Skin:     General: Skin is warm and dry.      Capillary Refill: Capillary refill takes less than 2 seconds.   Neurological:      General: No focal deficit present.      Mental Status: She is alert and oriented to person, place, and time.   Psychiatric:         Attention and Perception: Attention normal.         Mood and Affect: Mood normal.         Speech: Speech normal.         Behavior: Behavior is cooperative.       Imaging/Labs:  CT chest 8/2/23 @ [facility] (Personally reviewed and compared to prior imaging.  Agree w/ Radiologist assessment)  Impression:  1.  No new or enlarging pulmonary nodules are evident.  There is a subpleural nodular opacity at the right lung base.  Recommend additional follow-up in 6 months for further evaluation.  Mediastinal lymph nodes are unchanged.  2.  Moderate size hiatal hernia.  3.  Changes of hepatosteatosis  4.  Posttraumatic deformity of the sternum, kyphosis, and degenerative changes of the thoracic spine.  Electronically " signed by Cordell Null MD.  8/3/2023.      CT chest with and without contrast 2/17/2023: Personally reviewed.  Agree with radiologist assessment.  Impression:  1.  Status post right upper lobectomy.  2.  Interval clearing of the previously noted airspace consolidations and groundglass opacities.  3.  Stable pleural-based nodular opacity at the right lung base.  Close attention to this area on follow-up imaging is recommended.  4.  Moderate hiatal hernia      CT outside films (11/22/2022 12:13)  EXTERNAL MEDICAL RECORDS - SCAN - CHEST CT [facility] 11/18/22 (11/21/2022)  CT chest 11/18/2022: S/p right upper lobectomy.  Right middle and lower lobe consolidations and groundglass opacities and peribronchial and vascular distribution consistent with pneumonia.  Recommend follow-up CT to ensure resolution and exclude underlying evidence of recurrent malignancy.      CT chest with and without contrast ([facility])-3/1/2022  Right upper lobectomy.  No acute thoracic process is seen.     NM PET Skull Base to Mid Thigh-3/2/2021  Soft tissue nodular focus in the right lung apex posteriorly appears potentially more prominent and solid component in the inferior portion although measures unchanged from 01/04/2021 comparison with low-level/equivocal hypermetabolism associated, maximum SUV 2.5, but greater than blood pool activity in the aorta concerning for potential malignancy. No evidence for abnormal hypermetabolism outside of this area is noted on the current exam.     CT Needle Biopsy Lung-2/8/2021  CT-guided biopsy of 1.6 cm cavitary right upper lobe lung nodule.    Assessment / Plan      Assessment / Plan:  1. Malignant neoplasm of upper lobe of right lung (Primary)  - 53 y.o. female former smoker with history of Crohn's disease and stage IA2 invasive moderately differentiated adenocarcinoma s/p bronc/ VATS with right upper lobectomy and lymph node dissection on 3/18/2021 by Dr. Cevallos (pT1b N0 MX).   - Last  seen in clinic 2/14/23 to review 6-month CT imaging per NCCN guidelines with stable imaging.  Right pleural based density to be folllowed.    - Current exam 8/2/23 reviewed and noted to be stable without mediastinal lymphadenopathy, new nodules, and stable right pleural based opacity.  -  No acute complaints today: denies SOA, KELSEY, chronic cough, hemoptysis, lymphadenopathy, or  weight changes.    - Per NCCN guidelines, continue Q6 month CT chest imaging.  Next scan will be 24 months since surgery.        Patient Education: Report any new constitutional symptoms such as KELSEY, lymphadenopathy, unusual fatigue, cough, or unintentional weight loss      Follow Up:   Return in about 6 months (around 2/22/2024) for Lung cancer surveillance/ CT chest.   Or sooner for any further concerns or worsening sign and symptoms. If unable to reach us in the office please dial 911 or go to the nearest emergency department.      GERBER Nayak  [facility] Cardiothoracic Surgery    Time Spent: I spent 38 minutes caring for Birgit on this date of service. This time includes time spent by me in the following activities: preparing for the visit, reviewing tests, obtaining and/or reviewing a separately obtained history, performing a medically appropriate examination and/or evaluation, counseling and educating the patient/family/caregiver, ordering medications, tests, or procedures, documenting information in the medical record, independently interpreting results and communicating that information with the patient/family/caregiver, and care coordination.

## 2023-12-21 DIAGNOSIS — C34.11 MALIGNANT NEOPLASM OF UPPER LOBE OF RIGHT LUNG: Primary | ICD-10-CM

## 2024-03-05 DIAGNOSIS — C34.11 MALIGNANT NEOPLASM OF UPPER LOBE OF RIGHT LUNG: ICD-10-CM

## 2024-03-11 NOTE — PROGRESS NOTES
[facility] Cardiothoracic Surgery Office Follow Up Note     Date of Encounter: 2024     Name: Birgit Manuel  : 1970     Referred By: No ref. provider found  PCP: Jose Raines MD    Chief Complaint:    Chief Complaint   Patient presents with    Follow-up     6 Mo FU with CT Chest for Right Lung Cancer       Subjective      History of Present Illness:    Birgit Manuel is a 54 y.o. female followed by Dr. Cevallos for right upper lobe adenocarcinoma [id number], Stage IA2 s/p bronch/VATS with RUL lobectomy and lymph node dissection 3/18/2021.  PMH: Former smoker, Chron's disease, and RUL adenocarcinoma.  Last seen in clinic 2023 w/ CT chest demonstrating no new pulmonary nodules or mediastinal adenopathy and stable right pleural based opacity.  She returns for 6 month follow up CT chest.  Denies new constitutional symptoms such as KELSEY, lymphadenopathy, fatigue, or unintentional weight loss.  Has had recent allergy exacerbation with rhinorrhea and cough.      Review of Systems:  Review of Systems   Constitutional: Negative for chills, decreased appetite, diaphoresis, fever, malaise/fatigue, night sweats, weight gain and weight loss.   HENT:  Negative for hoarse voice.    Eyes:  Negative for blurred vision, double vision and visual disturbance.   Cardiovascular:  Positive for leg swelling and palpitations (anxiety related?). Negative for chest pain, claudication, dyspnea on exertion, irregular heartbeat, near-syncope, orthopnea, paroxysmal nocturnal dyspnea and syncope.   Respiratory:  Negative for cough, hemoptysis, shortness of breath, sputum production and wheezing.    Hematologic/Lymphatic: Negative for adenopathy and bleeding problem. Does not bruise/bleed easily.   Skin:  Negative for color change, nail changes, poor wound healing and rash.   Musculoskeletal:  Negative for back pain, falls and muscle cramps.   Gastrointestinal:  Negative for abdominal pain,  dysphagia and heartburn.   Genitourinary:  Negative for flank pain.   Neurological:  Negative for brief paralysis, disturbances in coordination, dizziness, focal weakness, headaches, light-headedness, loss of balance, numbness, paresthesias, sensory change, vertigo and weakness.   Psychiatric/Behavioral:  Negative for depression and suicidal ideas.    Allergic/Immunologic: Negative for persistent infections.       I have reviewed the following portions of the patient's history: problem list, current medications, allergies, past surgical history, past medical history, past social history, past family history, and ROS and confirm it's accurate.    Allergies:  No Known Allergies    Medications:    No current outpatient medications on file.    History:   Past Medical History:   Diagnosis Date    Crohn's disease     Depression     Lung cancer     Pneumonia 2022       Past Surgical History:   Procedure Laterality Date    BRONCHOSCOPY N/A 3/18/2021    Procedure: BRONCHOSCOPY;  Surgeon: Kvng Cevallos MD;  Location: [facility];  Service: Cardiothoracic;  Laterality: N/A;     SECTION      x2    CHOLECYSTECTOMY      COLON SURGERY      COLONOSCOPY      LUNG BIOPSY      LYMPH NODE DISSECTION Right 3/18/2021    Procedure: MEDIASTINAL LYMPH NODE DISSECTION;  Surgeon: Kvng Cevallos MD;  Location: [facility] OR;  Service: Cardiothoracic;  Laterality: Right;    THORACOSCOPY VIDEO ASSISTED WITH LOBECTOMY Right 3/18/2021    Procedure: THORACOSCOPY VIDEO ASSISTED WITH RIGHT UPPER LOBECTOMY AND INTERCOSTAL CRYO ABLATION;  Surgeon: Kvng Cevallos MD;  Location: [facility] OR;  Service: Cardiothoracic;  Laterality: Right;       Social History     Socioeconomic History    Marital status:     Number of children: 2   Tobacco Use    Smoking status: Former     Current packs/day: 0.00     Average packs/day: 0.5 packs/day for 18.0 years (9.0 ttl pk-yrs)     Types: Cigarettes, Electronic Cigarette     Start date:      Quit date:  "2006     Years since quittin.2    Smokeless tobacco: Never    Tobacco comments:     quit cigarettes , vaped 10 years quit 2020   Vaping Use    Vaping status: Former    Quit date: 2020   Substance and Sexual Activity    Alcohol use: No    Drug use: No    Sexual activity: Defer        Family History   Problem Relation Age of Onset    Crohn's disease Mother     Breast cancer Mother     Diabetes Father     Hypertension Father     Hyperlipidemia Father     Peripheral vascular disease Father     Heart failure Father        Objective   Physical Exam:  Vitals:    24 1514   BP: 160/100   BP Location: Left arm   Patient Position: Sitting   Pulse: 87   Temp: 98.4 °F (36.9 °C)   SpO2: 98%   Weight: 97.5 kg (215 lb)   Height: 185.4 cm (73\")  Comment: patient reported      Body mass index is 28.37 kg/m².    Physical Exam  Vitals reviewed.   Constitutional:       General: She is not in acute distress.     Appearance: She is well-developed and well-groomed.   HENT:      Head: Normocephalic and atraumatic.   Eyes:      General: Lids are normal.      Conjunctiva/sclera: Conjunctivae normal.      Pupils: Pupils are equal, round, and reactive to light.   Cardiovascular:      Rate and Rhythm: Normal rate and regular rhythm.      Heart sounds: S1 normal and S2 normal. No murmur heard.  Pulmonary:      Effort: Pulmonary effort is normal. No respiratory distress.      Breath sounds: Normal breath sounds.   Musculoskeletal:         General: Normal range of motion.      Cervical back: Normal range of motion and neck supple.   Lymphadenopathy:      Cervical: No cervical adenopathy.      Upper Body:      Right upper body: No supraclavicular or axillary adenopathy.      Left upper body: No supraclavicular or axillary adenopathy.   Skin:     General: Skin is warm and dry.      Capillary Refill: Capillary refill takes less than 2 seconds.   Neurological:      General: No focal deficit present.      Mental Status: She is " alert and oriented to person, place, and time.   Psychiatric:         Attention and Perception: Attention normal.         Mood and Affect: Mood normal.         Speech: Speech normal.         Behavior: Behavior normal. Behavior is cooperative.         Imaging/Labs:  CT chest 3/5/24 @ [facility] (Personally reviewed and compared to prior imaging.  Agree w/ Radiology assessment)  Impression:  1.  Continued stability of subpleural nodular opacity at the right lung base at the costophrenic angle.  Follow-up CT chest with contrast is recommended in 6 months to document stability.  2.  New region of groundglass attenuation within the right lung base posteriorly could represent segmental atelectasis or infectious/inflammatory process.  Attention to this area on subsequent imaging.  3.  Postsurgical changes of right upper lobectomy without evidence of residual/recurrent disease.    CT chest 8/2/23 @ [facility]   Impression:  1.  No new or enlarging pulmonary nodules are evident.  There is a subpleural nodular opacity at the right lung base.  Recommend additional follow-up in 6 months for further evaluation.  Mediastinal lymph nodes are unchanged.  2.  Moderate size hiatal hernia.  3.  Changes of hepatosteatosis  4.  Posttraumatic deformity of the sternum, kyphosis, and degenerative changes of the thoracic spine.  Electronically signed by Cordell Null MD.  8/3/2023.        CT chest with and without contrast 2/17/2023:   Impression:  1.  Status post right upper lobectomy.  2.  Interval clearing of the previously noted airspace consolidations and groundglass opacities.  3.  Stable pleural-based nodular opacity at the right lung base.  Close attention to this area on follow-up imaging is recommended.  4.  Moderate hiatal hernia       CT outside films (11/22/2022 12:13)  EXTERNAL MEDICAL RECORDS - SCAN - CHEST CT [facility] 11/18/22 (11/21/2022)  CT chest 11/18/2022: S/p right upper lobectomy.  Right middle and  lower lobe consolidations and groundglass opacities and peribronchial and vascular distribution consistent with pneumonia.  Recommend follow-up CT to ensure resolution and exclude underlying evidence of recurrent malignancy.      CT chest with and without contrast ([facility])-3/1/2022  Right upper lobectomy.  No acute thoracic process is seen.     NM PET Skull Base to Mid Thigh-3/2/2021  Soft tissue nodular focus in the right lung apex posteriorly appears potentially more prominent and solid component in the inferior portion although measures unchanged from 01/04/2021 comparison with low-level/equivocal hypermetabolism associated, maximum SUV 2.5, but greater than blood pool activity in the aorta concerning for potential malignancy. No evidence for abnormal hypermetabolism outside of this area is noted on the current exam.     CT Needle Biopsy Lung-2/8/2021  CT-guided biopsy of 1.6 cm cavitary right upper lobe lung nodule.    Assessment / Plan      Assessment / Plan:  1. Malignant neoplasm of upper lobe of right lung s/p right upper lobectomy  - 54 y.o. female followed by Dr. Cevallos for right upper lobe adenocarcinoma [id number], Stage IA2 s/p bronch/VATS with RUL lobectomy and lymph node dissection 3/18/2021.    - PMH: Former smoker, Chron's disease, and RUL adenocarcinoma.    - Last seen in clinic 8/22/2023 w/ CT chest demonstrating no new pulmonary nodules or mediastinal adenopathy and stable right pleural based opacity.   - Returns for 6 month follow up CT chest: no new pulmonary nodules or mediastinal adenopathy, stable right pleural opacity, new right pleural opacity.   - Rather than change to annual surveillance, will continue with Q6 month CT chest for surveillance per NCCN guidelines        Follow Up:   Return in about 6 months (around 9/12/2024) for Lung cancer surveillance/ CT chest.   Or sooner for any further concerns or worsening sign and symptoms. If unable to reach us in the office  please dial 911 or go to the nearest emergency department.      GERBER Nayak  [facility] Cardiothoracic Surgery    Time Spent: I spent 29 minutes caring for Birgit on this date of service. This time includes time spent by me in the following activities: preparing for the visit, reviewing tests, obtaining and/or reviewing a separately obtained history, performing a medically appropriate examination and/or evaluation, counseling and educating the patient/family/caregiver, documenting information in the medical record, independently interpreting results and communicating that information with the patient/family/caregiver, and care coordination.

## 2024-03-12 ENCOUNTER — OFFICE VISIT (OUTPATIENT)
Dept: CARDIAC SURGERY | Facility: CLINIC | Age: 54
End: 2024-03-12
Payer: COMMERCIAL

## 2024-03-12 VITALS
HEIGHT: 72 IN | OXYGEN SATURATION: 98 % | WEIGHT: 215 LBS | SYSTOLIC BLOOD PRESSURE: 160 MMHG | HEART RATE: 87 BPM | DIASTOLIC BLOOD PRESSURE: 100 MMHG | BODY MASS INDEX: 29.12 KG/M2 | TEMPERATURE: 98.4 F

## 2024-03-12 DIAGNOSIS — C34.11 MALIGNANT NEOPLASM OF UPPER LOBE OF RIGHT LUNG: Primary | ICD-10-CM

## 2024-03-12 PROCEDURE — 99214 OFFICE O/P EST MOD 30 MIN: CPT | Performed by: NURSE PRACTITIONER

## 2024-07-24 DIAGNOSIS — C34.11 MALIGNANT NEOPLASM OF UPPER LOBE OF RIGHT LUNG: Primary | ICD-10-CM

## 2024-08-15 ENCOUNTER — TELEPHONE (OUTPATIENT)
Dept: CARDIAC SURGERY | Facility: CLINIC | Age: 54
End: 2024-08-15
Payer: COMMERCIAL

## 2024-08-15 NOTE — TELEPHONE ENCOUNTER
PATIENT CALLED IN AN STATED SHE CANCELLED HER APPT WITH [facility] AND WAS NEEDING TO CANCEL THE TEST WITH [facility]. SHE STATED WHEN SHE READY SHE WILL CALL BACK AND SCHEDULE APPOINTMENT. I LET PT KNOW I WAS GOING AHEAD AND CANCELING ORDER TO [facility].

## (undated) DEVICE — GLV SURG BIOGELULTRATOUCH POLYISPRN PF LF SZ7 STRL

## (undated) DEVICE — NDL HYPO ECLPS SFTY 18G 1 1/2IN

## (undated) DEVICE — ELECTRD BLD EZ CLN MOD XLNG 2.75IN

## (undated) DEVICE — SYS PROB ABL/CRYO CRYOSPHERE 18IN

## (undated) DEVICE — SAFESECURE,SECUREMENT,FOLEY CATH,STERILE: Brand: MEDLINE

## (undated) DEVICE — SUT MNCRYL PLS ANTIB UD 4/0 PS2 18IN

## (undated) DEVICE — SYR SLP TP 10ML DISP

## (undated) DEVICE — SUT ETHIB 1 CT1 30IN  X425H

## (undated) DEVICE — VISUALIZATION SYSTEM: Brand: CLEARIFY

## (undated) DEVICE — Device

## (undated) DEVICE — ADHS SKIN PREMIERPRO EXOFIN TOPICAL HI/VISC .5ML

## (undated) DEVICE — ELECTRD BLD EZ CLN MOD 6.5IN

## (undated) DEVICE — TOTAL TRAY, 16FR 10ML SIL FOLEY, URN: Brand: MEDLINE

## (undated) DEVICE — SYR LL TP 10ML STRL

## (undated) DEVICE — BOWL UTIL STRL 32OZ

## (undated) DEVICE — SINGLE USE BIOPSY VALVE MAJ-210: Brand: SINGLE USE BIOPSY VALVE (STERILE)

## (undated) DEVICE — OASIS DRAIN, SINGLE, INLINE & ATS COMPATIBLE: Brand: OASIS

## (undated) DEVICE — PK THORACOTOMY 10

## (undated) DEVICE — ELECTRD BLD EDGE/INSUL1P SFTY SLV 2.75IN

## (undated) DEVICE — TISSUE RETRIEVAL SYSTEM: Brand: INZII RETRIEVAL SYSTEM

## (undated) DEVICE — ENDOPATH 5MM ENDOSCOPIC BLUNT TIP DISSECTORS (12 POUCHES CONTAINING 3 DISSECTORS EACH): Brand: ENDOPATH

## (undated) DEVICE — SPNG GZ WOVN 4X4IN 12PLY 10/BX STRL

## (undated) DEVICE — TRAP,MUCUS SPECIMEN,40CC: Brand: MEDLINE

## (undated) DEVICE — SUT VIC 2/0 CT2 27IN J269H

## (undated) DEVICE — SYR LUERLOK 20CC BX/50

## (undated) DEVICE — CVR HNDL LIGHT RIGID

## (undated) DEVICE — SUCTION CANISTER, 2500CC, RIGID: Brand: DEROYAL

## (undated) DEVICE — TUBING, SUCTION, 1/4" X 10', STRAIGHT: Brand: MEDLINE

## (undated) DEVICE — LINEAR ADAPTER: Brand: SIGNIA

## (undated) DEVICE — SUT SILK 2/0 30IN A305H

## (undated) DEVICE — CLTH CLENS READYCLEANSE PERI CARE PK/5

## (undated) DEVICE — ANTIBACTERIAL UNDYED BRAIDED (POLYGLACTIN 910), SYNTHETIC ABSORBABLE SURGICAL SUTURE: Brand: COATED VICRYL